# Patient Record
Sex: FEMALE | Race: BLACK OR AFRICAN AMERICAN | NOT HISPANIC OR LATINO | Employment: FULL TIME | URBAN - METROPOLITAN AREA
[De-identification: names, ages, dates, MRNs, and addresses within clinical notes are randomized per-mention and may not be internally consistent; named-entity substitution may affect disease eponyms.]

---

## 2020-04-10 ENCOUNTER — HOSPITAL ENCOUNTER (EMERGENCY)
Facility: HOSPITAL | Age: 55
Discharge: HOME | End: 2020-04-10
Attending: EMERGENCY MEDICINE
Payer: COMMERCIAL

## 2020-04-10 VITALS
HEART RATE: 61 BPM | OXYGEN SATURATION: 100 % | RESPIRATION RATE: 16 BRPM | TEMPERATURE: 98 F | WEIGHT: 175 LBS | DIASTOLIC BLOOD PRESSURE: 83 MMHG | SYSTOLIC BLOOD PRESSURE: 133 MMHG | HEIGHT: 67 IN | BODY MASS INDEX: 27.47 KG/M2

## 2020-04-10 DIAGNOSIS — Z20.822 CLOSE EXPOSURE TO COVID-19 VIRUS: Primary | ICD-10-CM

## 2020-04-10 LAB — SARS-COV-2 RDRP RESP QL NAA+PROBE: NEGATIVE

## 2020-04-10 PROCEDURE — 99284 EMERGENCY DEPT VISIT MOD MDM: CPT | Mod: 25

## 2020-04-10 PROCEDURE — 25000003 PHARM REV CODE 250: Performed by: NURSE PRACTITIONER

## 2020-04-10 PROCEDURE — U0002 COVID-19 LAB TEST NON-CDC: HCPCS

## 2020-04-10 RX ORDER — BUTALBITAL, ACETAMINOPHEN AND CAFFEINE 50; 325; 40 MG/1; MG/1; MG/1
1 TABLET ORAL EVERY 4 HOURS PRN
Qty: 20 TABLET | Refills: 0 | Status: SHIPPED | OUTPATIENT
Start: 2020-04-10 | End: 2020-05-10

## 2020-04-10 RX ORDER — BUTALBITAL, ACETAMINOPHEN AND CAFFEINE 50; 325; 40 MG/1; MG/1; MG/1
1 TABLET ORAL EVERY 4 HOURS PRN
Status: DISCONTINUED | OUTPATIENT
Start: 2020-04-10 | End: 2020-04-10 | Stop reason: HOSPADM

## 2020-04-10 RX ADMIN — BUTALBITAL, ACETAMINOPHEN AND CAFFEINE 1 TABLET: 50; 325; 40 TABLET ORAL at 03:04

## 2020-04-10 NOTE — DISCHARGE INSTRUCTIONS
At this time you must quarantine at home until you have   3 days without fever  2.   Must have not taken any fever reducing medications   3.  Other symptoms such as cough should be improving.  4.  Must have been at least 7 days since first symptom.    At this time it is important to self quarantine at home and to call the [facility] at [phone] or text PREETD to [phone] for further advice on when quarantine may be lifted.  You may talk to a doctor by virtual visit by going to [email or web address] or [email or web address] or call your primary care doctor for further advice.  You should return to the ER immediately if you have difficulty breathing, have any worsening symptoms or any concerns. You may call [phone] for 24/7 Covid-19 information.    Places for Testing         [place] (for [facility] and [facility] patients only)                   [facility] [address] [place] [address]                             [facility] [place] [address], [address]  [place] [address]    [place] [facility]  [address] [place] [address]          [facility] [address]        [address]         [place], [address]          [facility] [address]        [address] [place] [address]          [facility] [address] [employer] [address]        [address] [place] [facility] [place]  [address]  [place] [address]

## 2020-04-10 NOTE — ED NOTES
Discharge instructions, diagnosis, medications, and follow up discussed with patient. Patient verbalized understanding. All questions and concerns answered. No needs expressed at the time. Pt is awake, alert and oriented with no acute distress noted. Respirations even and unlabored.

## 2020-04-10 NOTE — ED NOTES
Patient reports headache and general malaise for a few days.     LOC: The patient is awake, alert and aware of environment with an appropriate affect, the patient is oriented x 3 and speaking appropriately.  APPEARANCE: Patient resting comfortably and in no acute distress, patient is clean and well groomed, patient's clothing properly fastened.  SKIN: The skin is warm and dry, color consistent with ethnicity, patient has normal skin turgor and moist mucus membranes, skin intact, no breakdown or brusing noted.  MUSKULOSKELETAL: Patient moving all extremities well, no obvious swelling or deformities noted.  RESPIRATORY: Airway is open and patent, respirations are spontaneous, patient has a normal effort and rate, no accessory muscle use noted.  CARDIAC: Patient has a normal rate and rhythm, no peripheral edema noted, capillary refill < 3 seconds.  ABDOMEN: Soft and non tender to palpation, no distention noted.  NEUROLOGIC: PERRL, 3mm bilaterally, eyes open spontaneously, behavior appropriate to situation, follows commands, facial expression symmetrical, bilateral hand grasp equal and even, purposeful motor response noted, normal sensation in all extremities when touched with a finger.

## 2020-04-10 NOTE — ED PROVIDER NOTES
Encounter Date: 4/10/2020       History     Chief Complaint   Patient presents with    Headache     Pt reports headache and general malaise. Pt reports taking care of  who has tested postive for COVID.      Presents with complaint of cough and HA  Denies fever or SOB  Pt is currently living with her  who was tested COVID 19 +         Review of patient's allergies indicates:  No Known Allergies  No past medical history on file.  No past surgical history on file.  No family history on file.  Social History     Tobacco Use    Smoking status: Not on file   Substance Use Topics    Alcohol use: Not on file    Drug use: Not on file     Review of Systems   Constitutional: Negative for fever.   Respiratory: Positive for cough. Negative for shortness of breath and wheezing.    Cardiovascular: Negative for chest pain, palpitations and leg swelling.   Gastrointestinal: Negative for abdominal pain, diarrhea, nausea and vomiting.   Genitourinary: Negative for dysuria.   Musculoskeletal: Negative for back pain.   Skin: Negative for rash.   Neurological: Positive for headaches. Negative for dizziness, weakness and light-headedness.       Physical Exam     Initial Vitals [04/10/20 1455]   BP Pulse Resp Temp SpO2   133/83 61 16 98.4 °F (36.9 °C) 100 %      MAP       --         Physical Exam    Constitutional: She appears well-developed and well-nourished.   HENT:   Head: Normocephalic and atraumatic.   Mouth/Throat: Oropharynx is clear and moist.   Eyes: Conjunctivae are normal.   Neck: Normal range of motion. Neck supple.   Cardiovascular: Normal rate and regular rhythm.   Pulmonary/Chest: Breath sounds normal. No respiratory distress.   Musculoskeletal: Normal range of motion.   Neurological: She is alert and oriented to person, place, and time. No sensory deficit. GCS score is 15. GCS eye subscore is 4. GCS verbal subscore is 5. GCS motor subscore is 6.   Skin: Skin is warm and dry. Capillary refill takes less  than 2 seconds.   Psychiatric: She has a normal mood and affect. Thought content normal.         ED Course   Procedures  Labs Reviewed   SARS-COV-2 RNA AMPLIFICATION, QUAL    Narrative:     What symptom criteria does the patient meet?->Cough          Imaging Results          X-Ray Chest AP Portable (Final result)  Result time 04/10/20 15:13:02    Final result by Erik Orourke MD (04/10/20 15:13:02)                 Impression:      1. Mild focal atelectasis or infiltrate at the right lung base.  2. Minimal linear scarring or atelectasis at the left lung base.  3. No other significant findings.      Electronically signed by: Erik Orourke MD  Date:    04/10/2020  Time:    15:13             Narrative:    EXAMINATION:  XR CHEST AP PORTABLE    CLINICAL HISTORY:  Headache, cough    COMPARISON:  None.    FINDINGS:  Heart size is normal.  The mediastinum is unremarkable.  There is mild linear atelectasis at the left lung base.  There is minimal focal atelectasis or infiltrate is demonstrated at the right lung base.  There are no pleural effusions.  Osseous structures are unremarkable.                                 Medical Decision Making:   Initial Assessment:   Cough and BLANCO   tested + for COVID    ED Management:  This pt has been given oral and written COVID 19 precautions  She verbalized understanding of instructions  She appears well and is in NAD  She reports HA improved with medication   Have discussed this pt with Dr. Bellamy              Attending Attestation:     Physician Attestation Statement for NP/PA:   I discussed this assessment and plan of this patient with the NP/PA, but I did not personally examine the patient. The face to face encounter was performed by the NP/PA.                                Clinical Impression:       ICD-10-CM ICD-9-CM   1. Close Exposure to Covid-19 Virus Z20.828                                 Aishwarya Manjarrez NP  04/10/20 1534       Walter Bellamy,  MD  04/10/20 1539

## 2021-03-01 ENCOUNTER — OFFICE VISIT (OUTPATIENT)
Dept: OBSTETRICS AND GYNECOLOGY | Facility: CLINIC | Age: 56
End: 2021-03-01
Payer: MEDICAID

## 2021-03-01 VITALS
BODY MASS INDEX: 28.25 KG/M2 | HEIGHT: 67 IN | DIASTOLIC BLOOD PRESSURE: 99 MMHG | SYSTOLIC BLOOD PRESSURE: 144 MMHG | WEIGHT: 180 LBS

## 2021-03-01 DIAGNOSIS — N90.89 VULVAR LESION: ICD-10-CM

## 2021-03-01 DIAGNOSIS — Z01.419 ENCOUNTER FOR ANNUAL ROUTINE GYNECOLOGICAL EXAMINATION: Primary | ICD-10-CM

## 2021-03-01 PROCEDURE — 99999 PR PBB SHADOW E&M-NEW PATIENT-LVL III: CPT | Mod: PBBFAC,,, | Performed by: ADVANCED PRACTICE MIDWIFE

## 2021-03-01 PROCEDURE — 99386 PREV VISIT NEW AGE 40-64: CPT | Mod: S$PBB,,, | Performed by: ADVANCED PRACTICE MIDWIFE

## 2021-03-01 PROCEDURE — 99999 PR PBB SHADOW E&M-NEW PATIENT-LVL III: ICD-10-PCS | Mod: PBBFAC,,, | Performed by: ADVANCED PRACTICE MIDWIFE

## 2021-03-01 PROCEDURE — 99386 PR PREVENTIVE VISIT,NEW,40-64: ICD-10-PCS | Mod: S$PBB,,, | Performed by: ADVANCED PRACTICE MIDWIFE

## 2021-03-01 PROCEDURE — 99203 OFFICE O/P NEW LOW 30 MIN: CPT | Mod: PBBFAC | Performed by: ADVANCED PRACTICE MIDWIFE

## 2021-03-01 PROCEDURE — 87529 HSV DNA AMP PROBE: CPT

## 2021-03-01 PROCEDURE — 88175 CYTOPATH C/V AUTO FLUID REDO: CPT | Performed by: ADVANCED PRACTICE MIDWIFE

## 2021-03-01 RX ORDER — METFORMIN HYDROCHLORIDE 500 MG/5ML
SOLUTION ORAL
COMMUNITY
End: 2021-05-12

## 2021-03-01 RX ORDER — LISINOPRIL 40 MG/1
TABLET ORAL
COMMUNITY
End: 2021-05-12

## 2021-03-02 RX ORDER — CLOTRIMAZOLE AND BETAMETHASONE DIPROPIONATE 10; .64 MG/G; MG/G
CREAM TOPICAL
Qty: 15 G | Refills: 1 | Status: SHIPPED | OUTPATIENT
Start: 2021-03-02 | End: 2021-05-12

## 2021-03-03 ENCOUNTER — HOSPITAL ENCOUNTER (OUTPATIENT)
Dept: RADIOLOGY | Facility: OTHER | Age: 56
Discharge: HOME | End: 2021-03-03
Attending: ADVANCED PRACTICE MIDWIFE
Payer: MEDICAID

## 2021-03-03 ENCOUNTER — PATIENT MESSAGE (OUTPATIENT)
Dept: OBSTETRICS AND GYNECOLOGY | Facility: CLINIC | Age: 56
End: 2021-03-03

## 2021-03-03 DIAGNOSIS — Z01.419 ENCOUNTER FOR ANNUAL ROUTINE GYNECOLOGICAL EXAMINATION: ICD-10-CM

## 2021-03-03 DIAGNOSIS — Z12.31 BREAST CANCER SCREENING BY MAMMOGRAM: ICD-10-CM

## 2021-03-03 LAB
HSV1 DNA SPEC QL NAA+PROBE: NEGATIVE
HSV2 DNA SPEC QL NAA+PROBE: NEGATIVE
SPECIMEN SOURCE: NORMAL

## 2021-03-03 PROCEDURE — 77063 BREAST TOMOSYNTHESIS BI: CPT | Mod: 26,,, | Performed by: RADIOLOGY

## 2021-03-03 PROCEDURE — 77063 MAMMO DIGITAL SCREENING BILAT WITH TOMO: ICD-10-PCS | Mod: 26,,, | Performed by: RADIOLOGY

## 2021-03-03 PROCEDURE — 77067 SCR MAMMO BI INCL CAD: CPT | Mod: TC

## 2021-03-03 PROCEDURE — 77067 MAMMO DIGITAL SCREENING BILAT WITH TOMO: ICD-10-PCS | Mod: 26,,, | Performed by: RADIOLOGY

## 2021-03-03 PROCEDURE — 77067 SCR MAMMO BI INCL CAD: CPT | Mod: 26,,, | Performed by: RADIOLOGY

## 2021-03-05 ENCOUNTER — PATIENT MESSAGE (OUTPATIENT)
Dept: OBSTETRICS AND GYNECOLOGY | Facility: CLINIC | Age: 56
End: 2021-03-05

## 2021-03-08 ENCOUNTER — PATIENT MESSAGE (OUTPATIENT)
Dept: OBSTETRICS AND GYNECOLOGY | Facility: CLINIC | Age: 56
End: 2021-03-08

## 2021-03-08 LAB
CLINICAL INFO: NORMAL
CYTO CVX: NORMAL
CYTOLOGIST CVX/VAG CYTO: NORMAL
CYTOLOGY CMNT CVX/VAG CYTO-IMP: NORMAL
CYTOLOGY PAP THIN PREP EXPLANATION: NORMAL
DATE OF PREVIOUS PAP: NORMAL
DATE PREVIOUS BX: NO
LMP START DATE: NORMAL
MICROORGANISM CVX/VAG CYTO: NORMAL
SPECIMEN SOURCE CVX/VAG CYTO: NORMAL
STAT OF ADQ CVX/VAG CYTO-IMP: NORMAL

## 2021-03-09 ENCOUNTER — PATIENT MESSAGE (OUTPATIENT)
Dept: OBSTETRICS AND GYNECOLOGY | Facility: CLINIC | Age: 56
End: 2021-03-09

## 2021-04-29 ENCOUNTER — PATIENT MESSAGE (OUTPATIENT)
Dept: RESEARCH | Facility: HOSPITAL | Age: 56
End: 2021-04-29

## 2021-05-12 ENCOUNTER — PATIENT MESSAGE (OUTPATIENT)
Dept: OBSTETRICS AND GYNECOLOGY | Facility: CLINIC | Age: 56
End: 2021-05-12

## 2021-05-12 ENCOUNTER — OFFICE VISIT (OUTPATIENT)
Dept: OBSTETRICS AND GYNECOLOGY | Facility: CLINIC | Age: 56
End: 2021-05-12
Payer: MEDICAID

## 2021-05-12 ENCOUNTER — HOSPITAL ENCOUNTER (OUTPATIENT)
Dept: RADIOLOGY | Facility: OTHER | Age: 56
Discharge: HOME | End: 2021-05-12
Attending: ADVANCED PRACTICE MIDWIFE
Payer: MEDICAID

## 2021-05-12 VITALS
DIASTOLIC BLOOD PRESSURE: 80 MMHG | BODY MASS INDEX: 28.68 KG/M2 | WEIGHT: 182.75 LBS | SYSTOLIC BLOOD PRESSURE: 134 MMHG | HEIGHT: 67 IN

## 2021-05-12 DIAGNOSIS — R07.81 RIB PAIN ON RIGHT SIDE: Primary | ICD-10-CM

## 2021-05-12 DIAGNOSIS — R07.81 RIB PAIN ON RIGHT SIDE: ICD-10-CM

## 2021-05-12 PROCEDURE — 71046 X-RAY EXAM CHEST 2 VIEWS: CPT | Mod: TC,FY

## 2021-05-12 PROCEDURE — 99999 PR PBB SHADOW E&M-EST. PATIENT-LVL III: CPT | Mod: PBBFAC,,, | Performed by: ADVANCED PRACTICE MIDWIFE

## 2021-05-12 PROCEDURE — 71046 X-RAY EXAM CHEST 2 VIEWS: CPT | Mod: 26,,, | Performed by: RADIOLOGY

## 2021-05-12 PROCEDURE — 99213 OFFICE O/P EST LOW 20 MIN: CPT | Mod: S$PBB,,, | Performed by: ADVANCED PRACTICE MIDWIFE

## 2021-05-12 PROCEDURE — 71046 XR CHEST PA AND LATERAL: ICD-10-PCS | Mod: 26,,, | Performed by: RADIOLOGY

## 2021-05-12 PROCEDURE — 99213 PR OFFICE/OUTPT VISIT, EST, LEVL III, 20-29 MIN: ICD-10-PCS | Mod: S$PBB,,, | Performed by: ADVANCED PRACTICE MIDWIFE

## 2021-05-12 PROCEDURE — 99999 PR PBB SHADOW E&M-EST. PATIENT-LVL III: ICD-10-PCS | Mod: PBBFAC,,, | Performed by: ADVANCED PRACTICE MIDWIFE

## 2021-05-12 PROCEDURE — 99213 OFFICE O/P EST LOW 20 MIN: CPT | Mod: PBBFAC,25 | Performed by: ADVANCED PRACTICE MIDWIFE

## 2021-05-12 RX ORDER — METFORMIN HYDROCHLORIDE 750 MG/1
750 TABLET, EXTENDED RELEASE ORAL DAILY
COMMUNITY
Start: 2021-05-06

## 2021-05-12 RX ORDER — LISINOPRIL AND HYDROCHLOROTHIAZIDE 20; 25 MG/1; MG/1
1 TABLET ORAL
COMMUNITY
Start: 2021-01-12

## 2021-08-18 RX ORDER — CLOTRIMAZOLE AND BETAMETHASONE DIPROPIONATE 10; .64 MG/G; MG/G
CREAM TOPICAL 2 TIMES DAILY
COMMUNITY
End: 2021-08-18 | Stop reason: SDUPTHER

## 2021-08-18 RX ORDER — CLOTRIMAZOLE AND BETAMETHASONE DIPROPIONATE 10; .64 MG/G; MG/G
CREAM TOPICAL 2 TIMES DAILY
Qty: 1 TUBE | Refills: 0 | Status: SHIPPED | OUTPATIENT
Start: 2021-08-18

## 2021-11-15 ENCOUNTER — IMMUNIZATION (OUTPATIENT)
Dept: PRIMARY CARE CLINIC | Facility: CLINIC | Age: 56
End: 2021-11-15
Payer: MEDICAID

## 2021-11-15 DIAGNOSIS — Z23 NEED FOR VACCINATION: Primary | ICD-10-CM

## 2021-11-15 PROCEDURE — 0004A COVID-19, MRNA, LNP-S, PF, 30 MCG/0.3 ML DOSE VACCINE: CPT | Mod: CV19,PBBFAC | Performed by: INTERNAL MEDICINE

## 2022-07-22 ENCOUNTER — OFFICE VISIT (OUTPATIENT)
Dept: OBSTETRICS AND GYNECOLOGY | Facility: CLINIC | Age: 57
End: 2022-07-22
Payer: COMMERCIAL

## 2022-07-22 VITALS
DIASTOLIC BLOOD PRESSURE: 80 MMHG | HEIGHT: 67 IN | SYSTOLIC BLOOD PRESSURE: 130 MMHG | BODY MASS INDEX: 26.96 KG/M2 | WEIGHT: 171.75 LBS

## 2022-07-22 DIAGNOSIS — Z01.419 WELL WOMAN EXAM WITH ROUTINE GYNECOLOGICAL EXAM: ICD-10-CM

## 2022-07-22 DIAGNOSIS — Z12.31 ENCOUNTER FOR SCREENING MAMMOGRAM FOR BREAST CANCER: Primary | ICD-10-CM

## 2022-07-22 PROCEDURE — 1159F MED LIST DOCD IN RCRD: CPT | Mod: CPTII,S$GLB,, | Performed by: ADVANCED PRACTICE MIDWIFE

## 2022-07-22 PROCEDURE — 99396 PREV VISIT EST AGE 40-64: CPT | Mod: S$GLB,,, | Performed by: ADVANCED PRACTICE MIDWIFE

## 2022-07-22 PROCEDURE — 99396 PR PREVENTIVE VISIT,EST,40-64: ICD-10-PCS | Mod: S$GLB,,, | Performed by: ADVANCED PRACTICE MIDWIFE

## 2022-07-22 PROCEDURE — 3008F BODY MASS INDEX DOCD: CPT | Mod: CPTII,S$GLB,, | Performed by: ADVANCED PRACTICE MIDWIFE

## 2022-07-22 PROCEDURE — 99999 PR PBB SHADOW E&M-EST. PATIENT-LVL III: CPT | Mod: PBBFAC,,, | Performed by: ADVANCED PRACTICE MIDWIFE

## 2022-07-22 PROCEDURE — 3079F DIAST BP 80-89 MM HG: CPT | Mod: CPTII,S$GLB,, | Performed by: ADVANCED PRACTICE MIDWIFE

## 2022-07-22 PROCEDURE — 1159F PR MEDICATION LIST DOCUMENTED IN MEDICAL RECORD: ICD-10-PCS | Mod: CPTII,S$GLB,, | Performed by: ADVANCED PRACTICE MIDWIFE

## 2022-07-22 PROCEDURE — 99999 PR PBB SHADOW E&M-EST. PATIENT-LVL III: ICD-10-PCS | Mod: PBBFAC,,, | Performed by: ADVANCED PRACTICE MIDWIFE

## 2022-07-22 PROCEDURE — 4010F PR ACE/ARB THEARPY RXD/TAKEN: ICD-10-PCS | Mod: CPTII,S$GLB,, | Performed by: ADVANCED PRACTICE MIDWIFE

## 2022-07-22 PROCEDURE — 3075F SYST BP GE 130 - 139MM HG: CPT | Mod: CPTII,S$GLB,, | Performed by: ADVANCED PRACTICE MIDWIFE

## 2022-07-22 PROCEDURE — 3008F PR BODY MASS INDEX (BMI) DOCUMENTED: ICD-10-PCS | Mod: CPTII,S$GLB,, | Performed by: ADVANCED PRACTICE MIDWIFE

## 2022-07-22 PROCEDURE — 4010F ACE/ARB THERAPY RXD/TAKEN: CPT | Mod: CPTII,S$GLB,, | Performed by: ADVANCED PRACTICE MIDWIFE

## 2022-07-22 PROCEDURE — 3079F PR MOST RECENT DIASTOLIC BLOOD PRESSURE 80-89 MM HG: ICD-10-PCS | Mod: CPTII,S$GLB,, | Performed by: ADVANCED PRACTICE MIDWIFE

## 2022-07-22 PROCEDURE — 3075F PR MOST RECENT SYSTOLIC BLOOD PRESS GE 130-139MM HG: ICD-10-PCS | Mod: CPTII,S$GLB,, | Performed by: ADVANCED PRACTICE MIDWIFE

## 2022-07-22 RX ORDER — EXENATIDE 2 MG/.85ML
INJECTION, SUSPENSION, EXTENDED RELEASE SUBCUTANEOUS
COMMUNITY
Start: 2022-02-14

## 2022-07-22 RX ORDER — SEMAGLUTIDE 1.34 MG/ML
0.25 INJECTION, SOLUTION SUBCUTANEOUS
COMMUNITY
Start: 2022-06-30

## 2022-07-22 RX ORDER — HYDROCHLOROTHIAZIDE 12.5 MG/1
CAPSULE ORAL
COMMUNITY

## 2022-07-22 RX ORDER — ATORVASTATIN CALCIUM 10 MG/1
10 TABLET, FILM COATED ORAL
COMMUNITY
Start: 2022-06-30 | End: 2023-06-30

## 2022-07-22 RX ORDER — LISINOPRIL 40 MG/1
TABLET ORAL
COMMUNITY

## 2022-07-22 NOTE — PROGRESS NOTES
HISTORY OF PRESENT ILLNESS:    Shawn Wagner is a 57 y.o. female, , No LMP recorded (exact date). Patient has had an implant.,  presents for a routine annual exam . Pt has no complaints or concerns. mirena -she thinks about 5 years. Pap current. Mammogram ordered, overdue. Thinks she did cologuard about 5 years ago, her pcp handles this. Feels safe at home. Declined STD testing    Past Medical History:   Diagnosis Date    Diabetes mellitus     Hypertension        Past Surgical History:   Procedure Laterality Date     SECTION         MEDICATIONS AND ALLERGIES:      Current Outpatient Medications:     atorvastatin (LIPITOR) 10 MG tablet, Take 10 mg by mouth., Disp: , Rfl:     BYDUREON BCISE 2 mg/0.85 mL AtIn, SMARTSI Milligram(s) Topical Once a Week, Disp: , Rfl:     clotrimazole-betamethasone 1-0.05% (LOTRISONE) cream, Apply topically 2 (two) times daily., Disp: 1 Tube, Rfl: 0    hydroCHLOROthiazide (MICROZIDE) 12.5 mg capsule, 1 cap(s), Disp: , Rfl:     lisinopriL (PRINIVIL,ZESTRIL) 40 MG tablet, 1 tab(s), Disp: , Rfl:     lisinopriL-hydrochlorothiazide (PRINZIDE,ZESTORETIC) 20-25 mg Tab, Take 1 tablet by mouth., Disp: , Rfl:     metFORMIN (GLUCOPHAGE-XR) 750 MG ER 24hr tablet, Take 750 mg by mouth once daily., Disp: , Rfl:     semaglutide (OZEMPIC) 0.25 mg or 0.5 mg(2 mg/1.5 mL) pen injector, Inject 0.25 mg into the skin., Disp: , Rfl:     Review of patient's allergies indicates:  No Known Allergies    Family History   Problem Relation Age of Onset    Diabetes Mother     Breast cancer Neg Hx     Cancer Neg Hx     Colon cancer Neg Hx     Ovarian cancer Neg Hx        Social History     Socioeconomic History    Marital status: Single   Tobacco Use    Smoking status: Never Smoker    Smokeless tobacco: Never Used   Substance and Sexual Activity    Alcohol use: Yes    Drug use: Never       COMPREHENSIVE GYN HISTORY:  PAP History: Denies abnormal Paps.  Infection History:  "Denies STDs. Denies PID.  Benign History: Denies uterine fibroids. Denies ovarian cysts. Denies endometriosis. Denies other conditions.  Cancer History: Denies cervical cancer. Denies uterine cancer or hyperplasia. Denies ovarian cancer. Denies vulvar cancer or pre-cancer. Denies vaginal cancer or pre-cancer. Denies breast cancer. Denies colon cancer.  Sexual Activity History:  currently  sexually active  Menstrual History: no menstrual cycle currently   Contraception: IUD    ROS:  GENERAL: No weight changes. No swelling. No fatigue. No fever.  BREASTS: No pain. No lumps. No discharge.  ABDOMEN: No pain. No nausea. No vomiting. No diarrhea. No constipation.  REPRODUCTIVE: No abnormal bleeding.   VULVA: No pain. No lesions. No itching.  VAGINA: No relaxation. No itching. No odor. No discharge. No lesions.  URINARY: No incontinence. No nocturia. No frequency. No dysuria.    /80   Ht 5' 7" (1.702 m)   Wt 77.9 kg (171 lb 11.8 oz)   LMP  (Exact Date)   BMI 26.90 kg/m²     PE:  APPEARANCE: Well nourished, well developed, in no acute distress.  AFFECT: WNL, alert and oriented x 3. Interactive during exam  SKIN: No acne or hirsutism.  NECK: Neck symmetric, without masses or thyromegaly.  NODES: No inguinal, axillary or femoral lymph node enlargement.  CHEST: Good respiratory effort.   ABDOMEN: Soft. No tenderness or masses. No hepatosplenomegaly. No hernias.  BREASTS: Symmetrical, no skin changes, visible lesions, palpable masses or nipple discharge bilaterally.  PELVIC: External female genitalia without lesions.  Female hair distribution. Adequate perineal body, Normal urethral meatus. Vagina moist and well rugated without lesions or discharge.  No significant cystocele or rectocele present. Cervix pink without lesions, discharge or tenderness. +IUD strings Uterus is 4-6 week size, regular, mobile and nontender. Adnexa without masses or tenderness.  EXTREMITIES: No edema    PROCEDURES:  String check  Breast " exam    DIAGNOSIS:  1. WWE    LABS AND TESTS ORDERED: mammo    MEDICATIONS PRESCRIBED:    COUNSELING:  The patient was counseled today on:  -A.C.S. Pap and pelvic exam guidelines, recomendations for yearly mammogram, monthly self breast exams and to follow up with her PCP for other health maintenance.    FOLLOW-UP annually for WWE - may discuss removal of IUD at next annual.

## 2022-08-05 ENCOUNTER — HOSPITAL ENCOUNTER (OUTPATIENT)
Dept: RADIOLOGY | Facility: HOSPITAL | Age: 57
Discharge: HOME | End: 2022-08-05
Attending: ADVANCED PRACTICE MIDWIFE
Payer: COMMERCIAL

## 2022-08-05 VITALS — HEIGHT: 67 IN | BODY MASS INDEX: 26.96 KG/M2 | WEIGHT: 171.75 LBS

## 2022-08-05 DIAGNOSIS — Z12.31 ENCOUNTER FOR SCREENING MAMMOGRAM FOR BREAST CANCER: ICD-10-CM

## 2022-08-05 PROCEDURE — 77067 SCR MAMMO BI INCL CAD: CPT | Mod: TC,PO

## 2022-08-05 PROCEDURE — 77063 BREAST TOMOSYNTHESIS BI: CPT | Mod: TC,PO

## 2022-08-08 ENCOUNTER — PATIENT MESSAGE (OUTPATIENT)
Dept: OBSTETRICS AND GYNECOLOGY | Facility: CLINIC | Age: 57
End: 2022-08-08
Payer: COMMERCIAL

## 2023-09-05 DIAGNOSIS — Z12.31 ENCOUNTER FOR SCREENING MAMMOGRAM FOR MALIGNANT NEOPLASM OF BREAST: Primary | ICD-10-CM

## 2023-09-14 ENCOUNTER — HOSPITAL ENCOUNTER (OUTPATIENT)
Dept: RADIOLOGY | Facility: HOSPITAL | Age: 58
Discharge: HOME | End: 2023-09-14
Attending: OBSTETRICS & GYNECOLOGY
Payer: COMMERCIAL

## 2023-09-14 DIAGNOSIS — Z12.31 ENCOUNTER FOR SCREENING MAMMOGRAM FOR MALIGNANT NEOPLASM OF BREAST: ICD-10-CM

## 2023-09-14 PROCEDURE — 77067 SCR MAMMO BI INCL CAD: CPT | Mod: TC,PO

## 2023-12-04 ENCOUNTER — OFFICE VISIT (OUTPATIENT)
Dept: URGENT CARE | Facility: CLINIC | Age: 58
End: 2023-12-04
Payer: COMMERCIAL

## 2023-12-04 VITALS
TEMPERATURE: 100 F | RESPIRATION RATE: 20 BRPM | OXYGEN SATURATION: 97 % | WEIGHT: 171 LBS | SYSTOLIC BLOOD PRESSURE: 131 MMHG | HEART RATE: 97 BPM | HEIGHT: 67 IN | DIASTOLIC BLOOD PRESSURE: 83 MMHG | BODY MASS INDEX: 26.84 KG/M2

## 2023-12-04 DIAGNOSIS — J01.10 ACUTE NON-RECURRENT FRONTAL SINUSITIS: Primary | ICD-10-CM

## 2023-12-04 PROCEDURE — 99214 PR OFFICE/OUTPT VISIT, EST, LEVL IV, 30-39 MIN: ICD-10-PCS | Mod: 25,S$GLB,, | Performed by: NURSE PRACTITIONER

## 2023-12-04 PROCEDURE — 99214 OFFICE O/P EST MOD 30 MIN: CPT | Mod: 25,S$GLB,, | Performed by: NURSE PRACTITIONER

## 2023-12-04 PROCEDURE — 96372 PR INJECTION,THERAP/PROPH/DIAG2ST, IM OR SUBCUT: ICD-10-PCS | Mod: S$GLB,,, | Performed by: NURSE PRACTITIONER

## 2023-12-04 PROCEDURE — 96372 THER/PROPH/DIAG INJ SC/IM: CPT | Mod: S$GLB,,, | Performed by: NURSE PRACTITIONER

## 2023-12-04 RX ORDER — DEXAMETHASONE SODIUM PHOSPHATE 4 MG/ML
4 INJECTION, SOLUTION INTRA-ARTICULAR; INTRALESIONAL; INTRAMUSCULAR; INTRAVENOUS; SOFT TISSUE
Status: COMPLETED | OUTPATIENT
Start: 2023-12-04 | End: 2023-12-04

## 2023-12-04 RX ORDER — FLUTICASONE PROPIONATE 50 MCG
2 SPRAY, SUSPENSION (ML) NASAL DAILY
Qty: 16 G | Refills: 1 | Status: SHIPPED | OUTPATIENT
Start: 2023-12-04 | End: 2024-01-03

## 2023-12-04 RX ORDER — AMOXICILLIN 500 MG/1
500 CAPSULE ORAL 3 TIMES DAILY
Qty: 30 CAPSULE | Refills: 0 | Status: SHIPPED | OUTPATIENT
Start: 2023-12-04 | End: 2023-12-14

## 2023-12-04 RX ORDER — PROMETHAZINE HYDROCHLORIDE AND DEXTROMETHORPHAN HYDROBROMIDE 6.25; 15 MG/5ML; MG/5ML
5 SYRUP ORAL EVERY 6 HOURS PRN
Qty: 120 ML | Refills: 0 | Status: SHIPPED | OUTPATIENT
Start: 2023-12-04 | End: 2023-12-11

## 2023-12-04 RX ADMIN — DEXAMETHASONE SODIUM PHOSPHATE 4 MG: 4 INJECTION, SOLUTION INTRA-ARTICULAR; INTRALESIONAL; INTRAMUSCULAR; INTRAVENOUS; SOFT TISSUE at 04:12

## 2023-12-04 NOTE — PROGRESS NOTES
"Subjective:      Patient ID: Shawn Wagner is a 58 y.o. female.    Vitals:  height is 5' 7" (1.702 m) and weight is 77.6 kg (171 lb). Her temperature is 99.9 °F (37.7 °C). Her blood pressure is 131/83 and her pulse is 97. Her respiration is 20 and oxygen saturation is 97%.     Chief Complaint: Fatigue    Sinus. Pt request shot.     Fatigue  The current episode started in the past 7 days. Associated symptoms include chills, coughing, fatigue and headaches. She has tried NSAIDs for the symptoms. The treatment provided no relief.       Constitution: Positive for chills and fatigue.   Respiratory:  Positive for cough.    Neurological:  Positive for headaches.      Objective:     Physical Exam   Constitutional: She is oriented to person, place, and time. She appears ill.   HENT:   Head: Normocephalic and atraumatic.   Nose: Nose normal.   Mouth/Throat: Mucous membranes are moist. Oropharynx is clear.   Eyes: Pupils are equal, round, and reactive to light.   Neck: Neck supple.   Cardiovascular: Normal rate, regular rhythm, normal heart sounds and normal pulses.   Pulmonary/Chest: Effort normal and breath sounds normal.   Abdominal: Normal appearance.   Neurological: no focal deficit. She is alert and oriented to person, place, and time. She displays no weakness. Coordination and gait normal.       Assessment:     1. Acute non-recurrent frontal sinusitis        Plan:       Acute non-recurrent frontal sinusitis    Other orders  -     dexAMETHasone injection 4 mg  -     amoxicillin (AMOXIL) 500 MG capsule; Take 1 capsule (500 mg total) by mouth 3 (three) times daily. for 10 days  Dispense: 30 capsule; Refill: 0  -     fluticasone propionate (FLONASE) 50 mcg/actuation nasal spray; 2 sprays (100 mcg total) by Each Nostril route once daily.  Dispense: 16 g; Refill: 1  -     promethazine-dextromethorphan (PROMETHAZINE-DM) 6.25-15 mg/5 mL Syrp; Take 5 mLs by mouth every 6 (six) hours as needed (night time cough).  Dispense: " 120 mL; Refill: 0                  Sinusitis s/s 7 days worsened. Requested shot.

## 2023-12-04 NOTE — PATIENT INSTRUCTIONS
Claritin(loratadine), Allegra(fexofenadine), or zyrtec(cetirizine) for runny nose and congestion. Take in the PM.   Mucinex DM daily in the morning.

## 2023-12-08 ENCOUNTER — HOSPITAL ENCOUNTER (EMERGENCY)
Facility: HOSPITAL | Age: 58
Discharge: HOME | End: 2023-12-09
Attending: EMERGENCY MEDICINE
Payer: COMMERCIAL

## 2023-12-08 VITALS
BODY MASS INDEX: 27.62 KG/M2 | HEIGHT: 67 IN | DIASTOLIC BLOOD PRESSURE: 80 MMHG | WEIGHT: 176 LBS | HEART RATE: 96 BPM | OXYGEN SATURATION: 98 % | RESPIRATION RATE: 18 BRPM | SYSTOLIC BLOOD PRESSURE: 121 MMHG | TEMPERATURE: 100 F

## 2023-12-08 DIAGNOSIS — R53.83 FATIGUE, UNSPECIFIED TYPE: Primary | ICD-10-CM

## 2023-12-08 DIAGNOSIS — N30.00 ACUTE CYSTITIS WITHOUT HEMATURIA: ICD-10-CM

## 2023-12-08 LAB
ALBUMIN SERPL BCP-MCNC: 4.4 G/DL (ref 3.5–5.2)
ALP SERPL-CCNC: 121 U/L (ref 55–135)
ALT SERPL W/O P-5'-P-CCNC: 32 U/L (ref 10–44)
ANION GAP SERPL CALC-SCNC: 9 MMOL/L (ref 8–16)
AST SERPL-CCNC: 34 U/L (ref 10–40)
B-HCG UR QL: NEGATIVE
BACTERIA #/AREA URNS HPF: ABNORMAL /HPF
BASOPHILS # BLD AUTO: 0.05 K/UL (ref 0–0.2)
BASOPHILS NFR BLD: 0.8 % (ref 0–1.9)
BILIRUB SERPL-MCNC: 0.6 MG/DL (ref 0.1–1)
BILIRUB UR QL STRIP: NEGATIVE
BUN SERPL-MCNC: 13 MG/DL (ref 6–20)
CALCIUM SERPL-MCNC: 9 MG/DL (ref 8.7–10.5)
CHLORIDE SERPL-SCNC: 99 MMOL/L (ref 95–110)
CLARITY UR: ABNORMAL
CO2 SERPL-SCNC: 26 MMOL/L (ref 23–29)
COLOR UR: YELLOW
CREAT SERPL-MCNC: 0.8 MG/DL (ref 0.5–1.4)
DIFFERENTIAL METHOD: ABNORMAL
EOSINOPHIL # BLD AUTO: 0.1 K/UL (ref 0–0.5)
EOSINOPHIL NFR BLD: 1.6 % (ref 0–8)
ERYTHROCYTE [DISTWIDTH] IN BLOOD BY AUTOMATED COUNT: 13.1 % (ref 11.5–14.5)
EST. GFR  (NO RACE VARIABLE): >60 ML/MIN/1.73 M^2
GLUCOSE SERPL-MCNC: 149 MG/DL (ref 70–110)
GLUCOSE UR QL STRIP: NEGATIVE
HCT VFR BLD AUTO: 39.2 % (ref 37–48.5)
HGB BLD-MCNC: 13.3 G/DL (ref 12–16)
HGB UR QL STRIP: NEGATIVE
HYALINE CASTS #/AREA URNS LPF: 19 /LPF
IMM GRANULOCYTES # BLD AUTO: 0.1 K/UL (ref 0–0.04)
IMM GRANULOCYTES NFR BLD AUTO: 1.6 % (ref 0–0.5)
KETONES UR QL STRIP: NEGATIVE
LEUKOCYTE ESTERASE UR QL STRIP: ABNORMAL
LYMPHOCYTES # BLD AUTO: 2.4 K/UL (ref 1–4.8)
LYMPHOCYTES NFR BLD: 38.6 % (ref 18–48)
MCH RBC QN AUTO: 29.9 PG (ref 27–31)
MCHC RBC AUTO-ENTMCNC: 33.9 G/DL (ref 32–36)
MCV RBC AUTO: 88 FL (ref 82–98)
MICROSCOPIC COMMENT: ABNORMAL
MONOCYTES # BLD AUTO: 0.5 K/UL (ref 0.3–1)
MONOCYTES NFR BLD: 7.7 % (ref 4–15)
NEUTROPHILS # BLD AUTO: 3.1 K/UL (ref 1.8–7.7)
NEUTROPHILS NFR BLD: 49.7 % (ref 38–73)
NITRITE UR QL STRIP: POSITIVE
NRBC BLD-RTO: 0 /100 WBC
PH UR STRIP: 6 [PH] (ref 5–8)
PLATELET # BLD AUTO: 269 K/UL (ref 150–450)
PMV BLD AUTO: 9.5 FL (ref 9.2–12.9)
POTASSIUM SERPL-SCNC: 3.8 MMOL/L (ref 3.5–5.1)
PROT SERPL-MCNC: 7.5 G/DL (ref 6–8.4)
PROT UR QL STRIP: ABNORMAL
RBC # BLD AUTO: 4.45 M/UL (ref 4–5.4)
RBC #/AREA URNS HPF: 1 /HPF (ref 0–4)
SODIUM SERPL-SCNC: 134 MMOL/L (ref 136–145)
SP GR UR STRIP: 1.02 (ref 1–1.03)
SQUAMOUS #/AREA URNS HPF: 1 /HPF
URN SPEC COLLECT METH UR: ABNORMAL
UROBILINOGEN UR STRIP-ACNC: ABNORMAL EU/DL
WBC # BLD AUTO: 6.22 K/UL (ref 3.9–12.7)
WBC #/AREA URNS HPF: 30 /HPF (ref 0–5)

## 2023-12-08 PROCEDURE — 80053 COMPREHEN METABOLIC PANEL: CPT | Performed by: NURSE PRACTITIONER

## 2023-12-08 PROCEDURE — 87077 CULTURE AEROBIC IDENTIFY: CPT | Performed by: NURSE PRACTITIONER

## 2023-12-08 PROCEDURE — 87086 URINE CULTURE/COLONY COUNT: CPT | Performed by: NURSE PRACTITIONER

## 2023-12-08 PROCEDURE — 81001 URINALYSIS AUTO W/SCOPE: CPT | Performed by: NURSE PRACTITIONER

## 2023-12-08 PROCEDURE — 87186 SC STD MICRODIL/AGAR DIL: CPT | Performed by: NURSE PRACTITIONER

## 2023-12-08 PROCEDURE — 85025 COMPLETE CBC W/AUTO DIFF WBC: CPT | Performed by: NURSE PRACTITIONER

## 2023-12-08 PROCEDURE — 99283 EMERGENCY DEPT VISIT LOW MDM: CPT

## 2023-12-08 PROCEDURE — 81025 URINE PREGNANCY TEST: CPT | Performed by: EMERGENCY MEDICINE

## 2023-12-08 RX ORDER — NITROFURANTOIN 25; 75 MG/1; MG/1
100 CAPSULE ORAL 2 TIMES DAILY
Qty: 14 CAPSULE | Refills: 0 | Status: SHIPPED | OUTPATIENT
Start: 2023-12-08 | End: 2023-12-15

## 2023-12-09 NOTE — FIRST PROVIDER EVALUATION
Emergency Department TeleTriage Encounter Note      CHIEF COMPLAINT    Chief Complaint   Patient presents with    Abdominal Pain     TODAY, STARTED ANTIBX 4 DAYS AGO FOR SINUS INFECTION,        VITAL SIGNS   Initial Vitals [12/08/23 1810]   BP Pulse Resp Temp SpO2   121/80 96 18 99.7 °F (37.6 °C) 98 %      MAP       --            ALLERGIES    Review of patient's allergies indicates:  No Known Allergies    PROVIDER TRIAGE NOTE  Verbal consent for the teletriage evaluation was given by the patient at the start of the evaluation.  All efforts will be made to maintain patient's privacy during the evaluation.      This is a teletriage evaluation of a 58 y.o. female presenting to the ED with c/o fatigue and sharp abdominal pain that started today.  On ABX for a sinus infection. Denies N/V/D.  Limited physical exam via telehealth: The patient is awake, alert, answering questions appropriately and is not in respiratory distress.  As the Teletriage provider, I performed an initial assessment and ordered appropriate labs and imaging studies, if any, to facilitate the patient's care once placed in the ED. Once a room is available, care and a full evaluation will be completed by an alternate ED provider.  Any additional orders and the final disposition will be determined by that provider.  All imaging and labs will not be followed-up by the Teletriage Team, including myself.          ORDERS  Labs Reviewed   CBC W/ AUTO DIFFERENTIAL   COMPREHENSIVE METABOLIC PANEL   URINALYSIS, REFLEX TO URINE CULTURE   PREGNANCY TEST, URINE RAPID       ED Orders (720h ago, onward)      Start Ordered     Status Ordering Provider    12/08/23 1815 12/08/23 1814  Vital signs  Every 2 hours         Ordered HELADIO CHONG    12/08/23 1815 12/08/23 1814  Diet NPO  Diet effective now         Ordered HELADIO CHONG    12/08/23 1815 12/08/23 1814  Insert peripheral IV  Once         Ordered HELADIO CHONG    12/08/23 1815 12/08/23 1814  CBC W/ AUTO  DIFFERENTIAL  STAT         Ordered HELADIO CHONG    12/08/23 1815 12/08/23 1814  Comp. Metabolic Panel  STAT         Ordered HELADIO CHONG    12/08/23 1815 12/08/23 1814  Urinalysis, Reflex to Urine Culture Urine, Clean Catch  STAT         Ordered HELADIO CHONG    12/08/23 1815 12/08/23 1814  Pregnancy, urine rapid  Once         Ordered HELADIO CHONG A              Virtual Visit Note: The provider triage portion of this emergency department evaluation and documentation was performed via [employer], a HIPAA-compliant telemedicine application, in concert with a tele-presenter in the room. A face to face patient evaluation with one of my colleagues will occur once the patient is placed in an emergency department room.      DISCLAIMER: This note was prepared with [employer] voice recognition transcription software. Garbled syntax, mangled pronouns, and other bizarre constructions may be attributed to that software system.

## 2023-12-09 NOTE — ED PROVIDER NOTES
Encounter Date: 2023       History     Chief Complaint   Patient presents with    Abdominal Pain     TODAY, STARTED ANTIBX 4 DAYS AGO FOR SINUS INFECTION,      58-year-old female presenting with complaint of generalized weakness and fatigue.  She says that she had sinus congestion and was started on amoxicillin for a sinus infection 4 days ago.  She is worsening, severe fatigue.  She says she is no energy.  She thinks the antibiotics are making her worse.  She is had some occasional pain in her left lower abdomen but says this is very brief, denies any pain currently.    The history is provided by the patient.     Review of patient's allergies indicates:  No Known Allergies  Past Medical History:   Diagnosis Date    Diabetes mellitus     Hypertension      Past Surgical History:   Procedure Laterality Date     SECTION       Family History   Problem Relation Age of Onset    Diabetes Mother     Breast cancer Maternal Aunt     Cancer Neg Hx     Colon cancer Neg Hx     Ovarian cancer Neg Hx      Social History     Tobacco Use    Smoking status: Never    Smokeless tobacco: Never   Substance Use Topics    Alcohol use: Yes    Drug use: Never     Review of Systems   Constitutional:  Positive for fatigue.   Gastrointestinal:  Positive for abdominal pain.   Musculoskeletal:  Positive for myalgias.   All other systems reviewed and are negative.      Physical Exam     Initial Vitals [23 1810]   BP Pulse Resp Temp SpO2   121/80 96 18 99.7 °F (37.6 °C) 98 %      MAP       --         Physical Exam    Nursing note and vitals reviewed.  Constitutional: She appears well-developed and well-nourished. She is not diaphoretic. No distress.   HENT:   Head: Normocephalic.   Eyes: Conjunctivae are normal.   Neck: Neck supple.   Normal range of motion.  Cardiovascular:  Normal rate.           Pulmonary/Chest: No respiratory distress.   Abdominal: Abdomen is soft. She exhibits no distension. There is no abdominal tenderness.    Musculoskeletal:         General: No edema.      Cervical back: Normal range of motion and neck supple.     Neurological: She is alert. She has normal strength. GCS eye subscore is 4. GCS verbal subscore is 5. GCS motor subscore is 6.   Skin: Skin is warm and dry.   Psychiatric: She has a normal mood and affect.         ED Course   Procedures  Labs Reviewed   CBC W/ AUTO DIFFERENTIAL - Abnormal; Notable for the following components:       Result Value    Immature Granulocytes 1.6 (*)     Immature Grans (Abs) 0.10 (*)     All other components within normal limits   COMPREHENSIVE METABOLIC PANEL - Abnormal; Notable for the following components:    Sodium 134 (*)     Glucose 149 (*)     All other components within normal limits   URINALYSIS, REFLEX TO URINE CULTURE - Abnormal; Notable for the following components:    Appearance, UA Hazy (*)     Protein, UA Trace (*)     Nitrite, UA Positive (*)     Urobilinogen, UA 4.0-6.0 (*)     Leukocytes, UA 2+ (*)     All other components within normal limits    Narrative:     Specimen Source->Urine   URINALYSIS MICROSCOPIC - Abnormal; Notable for the following components:    WBC, UA 30 (*)     Bacteria Many (*)     Hyaline Casts, UA 19 (*)     All other components within normal limits    Narrative:     Specimen Source->Urine   CULTURE, URINE   PREGNANCY TEST, URINE RAPID    Narrative:     Specimen Source->Urine   POCT URINE PREGNANCY          Imaging Results    None          Medications - No data to display  Medical Decision Making  Patient's abdominal exam is very benign without reproducible tenderness.  She has no leukocytosis.  Normal creatinine, electrolytes, LFTs.  She does have a large nitrite positive UTI.  Patient was reassured.  I will place her on Macrobid.  Discharged home with return precautions.    Amount and/or Complexity of Data Reviewed  Labs: ordered.    Risk  Prescription drug management.                                      Clinical Impression:  Final  diagnoses:  [R53.83] Fatigue, unspecified type (Primary)  [N30.00] Acute cystitis without hematuria          ED Disposition Condition    Discharge Stable          ED Prescriptions       Medication Sig Dispense Start Date End Date Auth. Provider    nitrofurantoin, macrocrystal-monohydrate, (MACROBID) 100 MG capsule Take 1 capsule (100 mg total) by mouth 2 (two) times daily. for 7 days 14 capsule 12/8/2023 12/15/2023 Hernan Aviles MD          Follow-up Information       Follow up With Specialties Details Why Contact Info    Sofi Kwok MD Hospitalist, Internal Medicine   [address]   [address]  [facility] [address]  [phone]               Hernan Aviles MD  12/09/23 0149

## 2023-12-10 LAB — BACTERIA UR CULT: ABNORMAL

## 2024-04-15 ENCOUNTER — TELEPHONE (OUTPATIENT)
Dept: NEUROLOGY | Facility: CLINIC | Age: 59
End: 2024-04-15
Payer: COMMERCIAL

## 2024-04-15 NOTE — TELEPHONE ENCOUNTER
----- Message from Radha Dumont sent at 4/15/2024  1:21 PM CDT -----  Type: Needs Medical Advice  Who Called: pt  Best Call Back Number: [phone]    Additional Information: Pt is calling the office needs to be seen for numbness in fingers.Please call back and advise.

## 2024-04-15 NOTE — TELEPHONE ENCOUNTER
Spoke to the pt, she is scheduled to see orthopedics tomorrow for possible CTS.  She will call back to schedule if ortho thinks it is necessary.

## 2024-04-16 ENCOUNTER — OFFICE VISIT (OUTPATIENT)
Dept: ORTHOPEDICS | Facility: CLINIC | Age: 59
End: 2024-04-16
Payer: COMMERCIAL

## 2024-04-16 DIAGNOSIS — R29.898 WEAKNESS OF BOTH HANDS: ICD-10-CM

## 2024-04-16 DIAGNOSIS — G56.01 RIGHT CARPAL TUNNEL SYNDROME: Primary | ICD-10-CM

## 2024-04-16 PROCEDURE — 99204 OFFICE O/P NEW MOD 45 MIN: CPT | Mod: S$GLB,,, | Performed by: PHYSICIAN ASSISTANT

## 2024-04-16 PROCEDURE — 4010F ACE/ARB THERAPY RXD/TAKEN: CPT | Mod: CPTII,S$GLB,, | Performed by: PHYSICIAN ASSISTANT

## 2024-04-16 PROCEDURE — 1160F RVW MEDS BY RX/DR IN RCRD: CPT | Mod: CPTII,S$GLB,, | Performed by: PHYSICIAN ASSISTANT

## 2024-04-16 PROCEDURE — 99999 PR PBB SHADOW E&M-EST. PATIENT-LVL III: CPT | Mod: PBBFAC,,, | Performed by: PHYSICIAN ASSISTANT

## 2024-04-16 PROCEDURE — 1159F MED LIST DOCD IN RCRD: CPT | Mod: CPTII,S$GLB,, | Performed by: PHYSICIAN ASSISTANT

## 2024-04-16 RX ORDER — MELOXICAM 15 MG/1
15 TABLET ORAL DAILY
Qty: 28 TABLET | Refills: 0 | Status: SHIPPED | OUTPATIENT
Start: 2024-04-16

## 2024-04-16 NOTE — PROGRESS NOTES
2024    Chief Complaint:  Chief Complaint   Patient presents with    Right Hand - Pain, Numbness, Swelling       HPI:  Shawn Wagner is a 59 y.o. female, who presents to clinic today for evaluation of her right index finger numbness, pain, and swelling.  States symptoms have been present for the past 8 weeks.  States he has had weakness of the bilateral hands of the past few years.  States he does have nocturnal paresthesias that wake her at night.  Denies any acute injuries.  Denies any other complaints this time.    PMHX:  Past Medical History:   Diagnosis Date    Diabetes mellitus     Hypertension        PSHX:  Past Surgical History:   Procedure Laterality Date     SECTION         FMHX:  Family History   Problem Relation Name Age of Onset    Diabetes Mother      Breast cancer Maternal Aunt      Cancer Neg Hx      Colon cancer Neg Hx      Ovarian cancer Neg Hx         SOCHX:  Social History     Tobacco Use    Smoking status: Never    Smokeless tobacco: Never   Substance Use Topics    Alcohol use: Yes       ALLERGIES:  Patient has no known allergies.    CURRENT MEDICATIONS:  Current Outpatient Medications on File Prior to Visit   Medication Sig Dispense Refill    BYDUREON BCISE 2 mg/0.85 mL AtIn SMARTSI Milligram(s) Topical Once a Week      clotrimazole-betamethasone 1-0.05% (LOTRISONE) cream Apply topically 2 (two) times daily. 1 Tube 0    hydroCHLOROthiazide (MICROZIDE) 12.5 mg capsule 1 cap(s)      lisinopriL (PRINIVIL,ZESTRIL) 40 MG tablet 1 tab(s)      lisinopriL-hydrochlorothiazide (PRINZIDE,ZESTORETIC) 20-25 mg Tab Take 1 tablet by mouth.      metFORMIN (GLUCOPHAGE-XR) 750 MG ER 24hr tablet Take 750 mg by mouth once daily.      semaglutide (OZEMPIC) 0.25 mg or 0.5 mg(2 mg/1.5 mL) pen injector Inject 0.25 mg into the skin.      atorvastatin (LIPITOR) 10 MG tablet Take 10 mg by mouth.       No current facility-administered medications on file prior to visit.       REVIEW OF  SYSTEMS:  Review of Systems   Constitutional: Negative.    HENT: Negative.     Eyes: Negative.    Respiratory: Negative.     Cardiovascular: Negative.    Gastrointestinal: Negative.    Genitourinary: Negative.    Musculoskeletal:  Positive for joint pain.   Skin: Negative.    Neurological:  Positive for tingling and weakness.   Endo/Heme/Allergies: Negative.    Psychiatric/Behavioral: Negative.       GENERAL PHYSICAL EXAM:   There were no vitals taken for this visit.   GEN: well developed, well nourished, no acute distress   HENT: Normocephalic, atraumatic   EYES: No discharge, conjunctiva normal   NECK: Supple, non-tender   PULM: No wheezing, no respiratory distress   CV: RRR   ABD: Soft, non-tender    ORTHO EXAM:   Examination of the bilateral hands/wrist reveals no edema, erythema, ecchymosis, or skin breakdown.  Able to flex extend the wrist appropriately.  Able make composite fist and fully extend all fingers.  No significant tenderness palpation throughout the bilateral hands.  4/5 /intrinsic strength.  4/5 thenar strength.  4/5 hypothenar strength.  4/5 thumb adduction strength.  Positive carpal Tinel's test.  Positive Durkan's test.  Reduced sensation of the right index finger.  Normal sensation in the remainder of the radial, ulnar, median nerve distributions.  Capillary refill less than 2 seconds.      RADIOLOGY:   None.    ASSESSMENT:   Bilateral carpal tunnel syndrome    PLAN:  1. I discussed with Shawn Wagner the carpal tunnel syndrome pathology and treatment options in detail during today's visit after treatment options were discussed, we decided the best course of action this time is to proceed with nighttime splinting via the bilateral carpal tunnel splints and to perform an EMG nerve conduction study to evaluate the severity of her bilateral carpal tunnel syndrome.  We also discussed would perform a course of oral anti-inflammatories via Mobic.  She verbally agreed with the treatment plan      2. She was scheduled for an EMG nerve conduction study with [facility] in clinic today.      3. She was prescribed Mobic 15 mg to be taken once daily.  She was instructed to discontinue medication for any adverse effects.  She was instructed to not take any other type of NSAIDs while taking this medication she verbalized understanding     4. She was provided bilateral carpal tunnel splints in clinic today.  She was instructed to with wear them every night until seen again in clinic.  She verbalized understanding     5. I would like him to follow up in clinic after the EMG to discuss the results.  She was instructed to contact clinic for any problems or concerns in the interim.

## 2024-04-18 ENCOUNTER — TELEPHONE (OUTPATIENT)
Dept: ORTHOPEDICS | Facility: CLINIC | Age: 59
End: 2024-04-18
Payer: COMMERCIAL

## 2024-04-18 NOTE — TELEPHONE ENCOUNTER
Called and spoke with patient regarding the increased selling in her fingers.  In explained this is normal seeing how she has stared wearing the nighttime CT splints.  I stated to sontinue this and take he mobic.  Pt verbalized understanding.

## 2024-04-18 NOTE — TELEPHONE ENCOUNTER
----- Message from Falguni Tian MA sent at 4/18/2024  9:17 AM CDT -----  Contact: pt  Still waking up  with swollen fingers, due to brace     Call back

## 2024-04-23 ENCOUNTER — TELEPHONE (OUTPATIENT)
Dept: ORTHOPEDICS | Facility: CLINIC | Age: 59
End: 2024-04-23
Payer: COMMERCIAL

## 2024-04-23 DIAGNOSIS — G56.01 RIGHT CARPAL TUNNEL SYNDROME: Primary | ICD-10-CM

## 2024-04-23 RX ORDER — GABAPENTIN 100 MG/1
100 CAPSULE ORAL NIGHTLY
Qty: 30 CAPSULE | Refills: 0 | Status: SHIPPED | OUTPATIENT
Start: 2024-04-23 | End: 2024-05-13 | Stop reason: SDUPTHER

## 2024-04-23 NOTE — TELEPHONE ENCOUNTER
Called and spoke with patient.  Informed her that we will send over Gabapentin and to only take at night.  Patient verbally understood and agreed.

## 2024-04-23 NOTE — TELEPHONE ENCOUNTER
----- Message from Neo Galindo sent at 4/23/2024  3:02 PM CDT -----  Patient states that her pain  medication isn't working and would like something stronger called in      [facility] - [place] - [address] [address]  [address]  [place] [address]  Phone: [phone] Fax: [phone]    Please call  [phone]

## 2024-04-23 NOTE — TELEPHONE ENCOUNTER
----- Message from Amina Franco MA sent at 4/23/2024  3:16 PM CDT -----  Please fill  ----- Message -----  From: Neo Galindo  Sent: 4/23/2024   3:03 PM CDT  To: Joseph Mccarthyy Staff    Patient states that her pain  medication isn't working and would like something stronger called in      [facility] [address] [place], [place] [address] - [address] [email or web address]  [address] [facility] [address]  Phone: [phone] Fax: [phone]    Please call  [phone]

## 2024-05-13 ENCOUNTER — OFFICE VISIT (OUTPATIENT)
Dept: ORTHOPEDICS | Facility: CLINIC | Age: 59
End: 2024-05-13
Payer: COMMERCIAL

## 2024-05-13 VITALS — HEIGHT: 67 IN | BODY MASS INDEX: 28.23 KG/M2 | WEIGHT: 179.88 LBS

## 2024-05-13 DIAGNOSIS — G56.01 RIGHT CARPAL TUNNEL SYNDROME: Primary | ICD-10-CM

## 2024-05-13 PROCEDURE — 1160F RVW MEDS BY RX/DR IN RCRD: CPT | Mod: CPTII,S$GLB,, | Performed by: PHYSICIAN ASSISTANT

## 2024-05-13 PROCEDURE — 3008F BODY MASS INDEX DOCD: CPT | Mod: CPTII,S$GLB,, | Performed by: PHYSICIAN ASSISTANT

## 2024-05-13 PROCEDURE — 1159F MED LIST DOCD IN RCRD: CPT | Mod: CPTII,S$GLB,, | Performed by: PHYSICIAN ASSISTANT

## 2024-05-13 PROCEDURE — 4010F ACE/ARB THERAPY RXD/TAKEN: CPT | Mod: CPTII,S$GLB,, | Performed by: PHYSICIAN ASSISTANT

## 2024-05-13 PROCEDURE — 20526 THER INJECTION CARP TUNNEL: CPT | Mod: RT,S$GLB,, | Performed by: PHYSICIAN ASSISTANT

## 2024-05-13 PROCEDURE — 99213 OFFICE O/P EST LOW 20 MIN: CPT | Mod: 25,S$GLB,, | Performed by: PHYSICIAN ASSISTANT

## 2024-05-13 PROCEDURE — 99999 PR PBB SHADOW E&M-EST. PATIENT-LVL III: CPT | Mod: PBBFAC,,, | Performed by: PHYSICIAN ASSISTANT

## 2024-05-13 PROCEDURE — 3044F HG A1C LEVEL LT 7.0%: CPT | Mod: CPTII,S$GLB,, | Performed by: PHYSICIAN ASSISTANT

## 2024-05-13 RX ORDER — TRIAMCINOLONE ACETONIDE 40 MG/ML
40 INJECTION, SUSPENSION INTRA-ARTICULAR; INTRAMUSCULAR
Status: DISCONTINUED | OUTPATIENT
Start: 2024-05-13 | End: 2024-05-13 | Stop reason: HOSPADM

## 2024-05-13 RX ORDER — GABAPENTIN 100 MG/1
100 CAPSULE ORAL NIGHTLY
Qty: 30 CAPSULE | Refills: 0 | Status: SHIPPED | OUTPATIENT
Start: 2024-05-13 | End: 2024-06-12

## 2024-05-13 RX ADMIN — TRIAMCINOLONE ACETONIDE 40 MG: 40 INJECTION, SUSPENSION INTRA-ARTICULAR; INTRAMUSCULAR at 04:05

## 2024-05-13 NOTE — PROGRESS NOTES
2024    HPI:  Shawn Wagner is a 59 y.o. female, who presents to clinic today for  continued evaluation of her bilateral carpal tunnel syndrome and to discuss her EMG results.  States taking the gabapentin wearing the nighttime splints has significantly improved her symptoms.  Denies any acute injuries since last visit.  Denies any other complaints at this time.    PMHX:  Past Medical History:   Diagnosis Date    Diabetes mellitus     Hypertension        PSHX:  Past Surgical History:   Procedure Laterality Date     SECTION         FMHX:  Family History   Problem Relation Name Age of Onset    Diabetes Mother      Breast cancer Maternal Aunt      Cancer Neg Hx      Colon cancer Neg Hx      Ovarian cancer Neg Hx         SOCHX:  Social History     Tobacco Use    Smoking status: Never    Smokeless tobacco: Never   Substance Use Topics    Alcohol use: Yes       ALLERGIES:  Patient has no known allergies.    CURRENT MEDICATIONS:  Current Outpatient Medications on File Prior to Visit   Medication Sig Dispense Refill    atorvastatin (LIPITOR) 10 MG tablet Take 10 mg by mouth.      BYDUREON BCISE 2 mg/0.85 mL AtIn SMARTSI Milligram(s) Topical Once a Week      clotrimazole-betamethasone 1-0.05% (LOTRISONE) cream Apply topically 2 (two) times daily. 1 Tube 0    gabapentin (NEURONTIN) 100 MG capsule Take 1 capsule (100 mg total) by mouth every evening. 30 capsule 0    hydroCHLOROthiazide (MICROZIDE) 12.5 mg capsule 1 cap(s)      lisinopriL (PRINIVIL,ZESTRIL) 40 MG tablet 1 tab(s)      lisinopriL-hydrochlorothiazide (PRINZIDE,ZESTORETIC) 20-25 mg Tab Take 1 tablet by mouth.      meloxicam (MOBIC) 15 MG tablet Take 1 tablet (15 mg total) by mouth once daily. 28 tablet 0    metFORMIN (GLUCOPHAGE-XR) 750 MG ER 24hr tablet Take 750 mg by mouth once daily.      semaglutide (OZEMPIC) 0.25 mg or 0.5 mg(2 mg/1.5 mL) pen injector Inject 0.25 mg into the skin.       No current facility-administered medications on file  "prior to visit.       REVIEW OF SYSTEMS:  Review of Systems Complete; Negative, unless noted above.    GENERAL PHYSICAL EXAM:   Ht 5' 7" (1.702 m)   Wt 81.6 kg (179 lb 14.3 oz)   BMI 28.18 kg/m²    GEN: well developed, well nourished, no acute distress   PULM: No wheezing, no respiratory distress   CV: RRR    ORTHO EXAM:     Examination of the right hand/wrist reveals no edema, erythema, ecchymosis, or skin breakdown.  Able make composite fist and fully extend all fingers.  Sensation is grossly intact in the radial, ulnar, median nerve distributions.  Capillary refill less than 2 seconds.    RADIOLOGY:     None.    EMG:     EMG nerve conduction study of the bilateral upper extremities showed a normal electrodiagnostic study.  EMG showed no electrodiagnostic evidence of mononeuropathies of the bilateral upper extremities.    ASSESSMENT:     Bilateral carpal tunnel syndrome versus cervical radiculopathy    PLAN:  1. I discussed with Shawn Wagner  that considering the results of her EMG nerve conduction study that the best course of action this time we would be perform both a diagnostic and therapeutic steroid carpal tunnel injection in clinic today.  We discussed we would proceed with a right carpal tunnel injection in clinic today.  She verbally agreed with the treatment plan     2.  Informed consent was obtained .  After an alcohol prep followed by a chlorhexidine prep, a steroid injection was placed into the right carpal tunnel.  She tolerated the procedure well with no immediate complications.    3.  I would like to have her follow up in clinic in 3 weeks for repeat evaluation.  She was instructed to contact clinic for any problems or concerns in the interim.       "

## 2024-05-13 NOTE — PROCEDURES
Carpal Tunnel    Date/Time: 5/13/2024 4:20 PM    Performed by: Lorne Ruiz PA-C  Authorized by: Lorne Ruiz PA-C    Consent Done?:  Yes (Verbal)  Indications:  Pain  Site marked: the procedure site was marked    Timeout: prior to procedure the correct patient, procedure, and site was verified    Prep: patient was prepped and draped in usual sterile fashion      Local anesthesia used?: Yes    Local anesthetic:  Lidocaine 1% without epinephrine  Anesthetic total (ml):  0.5    Location:  Wrist  Site:  R carpal tunnel  Ultrasonic Guidance for Needle Placement?: No    Needle size:  25 G  Approach:  Volar  Medications:  40 mg triamcinolone acetonide 40 mg/mL (20 mg injected)  Patient tolerance:  Patient tolerated the procedure well with no immediate complications

## 2024-06-03 ENCOUNTER — OFFICE VISIT (OUTPATIENT)
Dept: ORTHOPEDICS | Facility: CLINIC | Age: 59
End: 2024-06-03
Payer: COMMERCIAL

## 2024-06-03 VITALS — HEIGHT: 67 IN | WEIGHT: 179.88 LBS | BODY MASS INDEX: 28.23 KG/M2

## 2024-06-03 DIAGNOSIS — M54.12 CERVICAL RADICULOPATHY: ICD-10-CM

## 2024-06-03 DIAGNOSIS — G56.01 RIGHT CARPAL TUNNEL SYNDROME: Primary | ICD-10-CM

## 2024-06-03 PROCEDURE — 99213 OFFICE O/P EST LOW 20 MIN: CPT | Mod: S$GLB,,, | Performed by: PHYSICIAN ASSISTANT

## 2024-06-03 PROCEDURE — 1159F MED LIST DOCD IN RCRD: CPT | Mod: CPTII,S$GLB,, | Performed by: PHYSICIAN ASSISTANT

## 2024-06-03 PROCEDURE — 99999 PR PBB SHADOW E&M-EST. PATIENT-LVL III: CPT | Mod: PBBFAC,,, | Performed by: PHYSICIAN ASSISTANT

## 2024-06-03 PROCEDURE — 4010F ACE/ARB THERAPY RXD/TAKEN: CPT | Mod: CPTII,S$GLB,, | Performed by: PHYSICIAN ASSISTANT

## 2024-06-03 PROCEDURE — 1160F RVW MEDS BY RX/DR IN RCRD: CPT | Mod: CPTII,S$GLB,, | Performed by: PHYSICIAN ASSISTANT

## 2024-06-03 PROCEDURE — 3044F HG A1C LEVEL LT 7.0%: CPT | Mod: CPTII,S$GLB,, | Performed by: PHYSICIAN ASSISTANT

## 2024-06-03 NOTE — PROGRESS NOTES
6/3/2024    HPI:  Shawn Wagner is a 59 y.o. female, who presents to clinic today for continued evaluation of her right carpal tunnel syndrome.  States the injection she received approximately 3 weeks ago has significantly improved her symptoms.  States he does continues to have numbness of the tip of the right index finger.  States she no longer needs to take gabapentin, and that the pain has significantly improved.  Denies any acute injuries since her last visit.  States she was occasionally gets some numbness and tingling that goes into her shoulder.  Denies any other complaints at this time.    PMHX:  Past Medical History:   Diagnosis Date    Diabetes mellitus     Hypertension        PSHX:  Past Surgical History:   Procedure Laterality Date     SECTION         FMHX:  Family History   Problem Relation Name Age of Onset    Diabetes Mother      Breast cancer Maternal Aunt      Cancer Neg Hx      Colon cancer Neg Hx      Ovarian cancer Neg Hx         SOCHX:  Social History     Tobacco Use    Smoking status: Never    Smokeless tobacco: Never   Substance Use Topics    Alcohol use: Yes       ALLERGIES:  Patient has no known allergies.    CURRENT MEDICATIONS:  Current Outpatient Medications on File Prior to Visit   Medication Sig Dispense Refill    BYDUREON BCISE 2 mg/0.85 mL AtIn SMARTSI Milligram(s) Topical Once a Week      clotrimazole-betamethasone 1-0.05% (LOTRISONE) cream Apply topically 2 (two) times daily. 1 Tube 0    gabapentin (NEURONTIN) 100 MG capsule Take 1 capsule (100 mg total) by mouth every evening. 30 capsule 0    hydroCHLOROthiazide (MICROZIDE) 12.5 mg capsule 1 cap(s)      lisinopriL (PRINIVIL,ZESTRIL) 40 MG tablet 1 tab(s)      lisinopriL-hydrochlorothiazide (PRINZIDE,ZESTORETIC) 20-25 mg Tab Take 1 tablet by mouth.      meloxicam (MOBIC) 15 MG tablet Take 1 tablet (15 mg total) by mouth once daily. 28 tablet 0    metFORMIN (GLUCOPHAGE-XR) 750 MG ER 24hr tablet Take 750 mg by mouth  "once daily.      semaglutide (OZEMPIC) 0.25 mg or 0.5 mg(2 mg/1.5 mL) pen injector Inject 0.25 mg into the skin.      atorvastatin (LIPITOR) 10 MG tablet Take 10 mg by mouth.       No current facility-administered medications on file prior to visit.       REVIEW OF SYSTEMS:  Review of Systems Complete; Negative, unless noted above.    GENERAL PHYSICAL EXAM:   Ht 5' 7" (1.702 m)   Wt 81.6 kg (179 lb 14.3 oz)   BMI 28.18 kg/m²    GEN: well developed, well nourished, no acute distress   PULM: No wheezing, no respiratory distress   CV: RRR    ORTHO EXAM:   Examination of the right hand/wrist reveals no edema, erythema, ecchymosis, or skin breakdown.  Able make composite fist and fully extend all fingers.  Negative carpal Tinel's test.  Positive Durkan's test.  Sensation is grossly intact in the radial, ulnar, median nerve distributions.  Capillary refill less than 2 seconds.    RADIOLOGY:   None.    ASSESSMENT:   Right carpal tunnel syndrome with questionable superimposed cervical radiculopathy    PLAN:  1. I discussed with Shawn Wagner that considering the therapeutic/diagnostic carpal tunnel injection provided significant relief, but not total relief that the best course of action this time is to have her follow up with back and spine to evaluate her cervical spine.  We also discussed for any returning/worsening of her symptoms to follow up with Dr. Gillis to discuss the possibility of surgical intervention.  She verbally agreed with the treatment plan    2. She was referred to back and spine in clinic today.    3. I would like her follow up in clinic on a p.r.n. basis for any worsening of his symptoms or for any hand, wrist, elbow problems/concerns.  She was instructed to contact the clinic for any problems or concerns in the interim.      "

## 2024-07-02 ENCOUNTER — OFFICE VISIT (OUTPATIENT)
Dept: PAIN MEDICINE | Facility: CLINIC | Age: 59
End: 2024-07-02
Payer: COMMERCIAL

## 2024-07-02 ENCOUNTER — HOSPITAL ENCOUNTER (OUTPATIENT)
Dept: RADIOLOGY | Facility: HOSPITAL | Age: 59
Discharge: HOME | End: 2024-07-02
Attending: STUDENT IN AN ORGANIZED HEALTH CARE EDUCATION/TRAINING PROGRAM
Payer: COMMERCIAL

## 2024-07-02 VITALS — WEIGHT: 179 LBS | BODY MASS INDEX: 28.09 KG/M2 | HEIGHT: 67 IN

## 2024-07-02 DIAGNOSIS — M54.12 CERVICAL RADICULOPATHY: ICD-10-CM

## 2024-07-02 DIAGNOSIS — M54.2 NECK PAIN: Primary | ICD-10-CM

## 2024-07-02 DIAGNOSIS — M54.2 NECK PAIN: ICD-10-CM

## 2024-07-02 PROCEDURE — 72040 X-RAY EXAM NECK SPINE 2-3 VW: CPT | Mod: 26,,, | Performed by: RADIOLOGY

## 2024-07-02 PROCEDURE — 3008F BODY MASS INDEX DOCD: CPT | Mod: CPTII,S$GLB,, | Performed by: STUDENT IN AN ORGANIZED HEALTH CARE EDUCATION/TRAINING PROGRAM

## 2024-07-02 PROCEDURE — 72040 X-RAY EXAM NECK SPINE 2-3 VW: CPT | Mod: TC

## 2024-07-02 PROCEDURE — 1159F MED LIST DOCD IN RCRD: CPT | Mod: CPTII,S$GLB,, | Performed by: STUDENT IN AN ORGANIZED HEALTH CARE EDUCATION/TRAINING PROGRAM

## 2024-07-02 PROCEDURE — 99999 PR PBB SHADOW E&M-EST. PATIENT-LVL IV: CPT | Mod: PBBFAC,,, | Performed by: STUDENT IN AN ORGANIZED HEALTH CARE EDUCATION/TRAINING PROGRAM

## 2024-07-02 PROCEDURE — 99203 OFFICE O/P NEW LOW 30 MIN: CPT | Mod: S$GLB,,, | Performed by: STUDENT IN AN ORGANIZED HEALTH CARE EDUCATION/TRAINING PROGRAM

## 2024-07-02 PROCEDURE — 3044F HG A1C LEVEL LT 7.0%: CPT | Mod: CPTII,S$GLB,, | Performed by: STUDENT IN AN ORGANIZED HEALTH CARE EDUCATION/TRAINING PROGRAM

## 2024-07-02 PROCEDURE — 4010F ACE/ARB THERAPY RXD/TAKEN: CPT | Mod: CPTII,S$GLB,, | Performed by: STUDENT IN AN ORGANIZED HEALTH CARE EDUCATION/TRAINING PROGRAM

## 2024-07-02 PROCEDURE — 1160F RVW MEDS BY RX/DR IN RCRD: CPT | Mod: CPTII,S$GLB,, | Performed by: STUDENT IN AN ORGANIZED HEALTH CARE EDUCATION/TRAINING PROGRAM

## 2024-07-02 NOTE — PROGRESS NOTES
[place] - Department    Sofi Kwok MD      First Office Visit: 7/2/24  Today' Date: 7/2/2024  Last Office Visit: None    Chief complaint: neck pain     HPI: Pt is a pleasant 59 y.o., who presents for evaluation. Referred by Lorne Ruiz PA-C. Pt complains of neck tingling and bilateral arm numbness/tingling. Pt endorses having L>R sided tingling from the neck to the R shoulder and R index finger numbness. States recently, she started experiencing pins and needles sensation going down the L arm to her fingers. No headaches, no problems dropping objects, no balance issues. No BB changes. Is not interested in PT and would prefer HEP.         Pain disability index score: 63  Pain score: 5    Relevant Imaging/ Testing: None    Procedures: None    Date of board of pharmacy review:7/2/2024  Date of opioid risk screening/ pain psych: None  Date of opioid agreement and consent: None  Date of urine drug screen: None  Date of random pill count: None     was reviewed today: reviewed, no concerns     Prescribed medications: None    See EHR for  PMH, PSH, FH, SH, Medications and Allergy    ROS:  Positive for pain  ROS     PE:  There were no vitals filed for this visit.  General: Pleasant, no distress  HEENT: NC/ AT. PERRLA  CV: Radial pulses intact  Pulm: No distress  Ext: No edema    Physical Exam     Neuromusculoskeletal:  Head: NC, AT. PERRLA. No occipital tenderness  Neck: Intact range of motions, extension, flexion, rotation. Neg Facet loading. Neg Spurling. Min Tenderness.  5/5 Strength, normal tone. Neg Nieves's  Shoulder: Intact range of motion  Lumbar: Intact range of motion  Hip: Intact range of motion  SI: Level  Knee: Intact range of motion  Reflexes: normal Bicep  Strength: 5/5 globally   Sensory: Grossly intact   Skin: No bruising, erythema  Gait: Normal      Impression:  Neck pain (L>R)   Bilat arm numbness/tingling   Relevant History  BMI 28.04          Plan:  Discussed options  Imaging/  relevant records viewed/ reviewed/ discussed  Imaging results viewed and reviewed (noted above)/ reviewed with patient   reviewed  HEP provided  XR C-spine  Re-eval after  MR C-spine if pain persists  Consider ILESI C7-T1      Prescribed medications:  1. None    The impression and plan were discussed and explained in detail. All the questions were answered. Education was provided accordingly.         Follow-up:  6 wks or sooner if needed    Maddie Aemzcua MD

## 2024-08-23 ENCOUNTER — TELEPHONE (OUTPATIENT)
Dept: PAIN MEDICINE | Facility: CLINIC | Age: 59
End: 2024-08-23

## 2024-08-23 ENCOUNTER — OFFICE VISIT (OUTPATIENT)
Dept: PAIN MEDICINE | Facility: CLINIC | Age: 59
End: 2024-08-23
Payer: COMMERCIAL

## 2024-08-23 VITALS — WEIGHT: 179 LBS | BODY MASS INDEX: 28.09 KG/M2 | HEIGHT: 67 IN

## 2024-08-23 DIAGNOSIS — M54.12 CERVICAL RADICULOPATHY: Primary | ICD-10-CM

## 2024-08-23 DIAGNOSIS — M54.2 CERVICALGIA: ICD-10-CM

## 2024-08-23 PROCEDURE — 99999 PR PBB SHADOW E&M-EST. PATIENT-LVL IV: CPT | Mod: PBBFAC,,, | Performed by: STUDENT IN AN ORGANIZED HEALTH CARE EDUCATION/TRAINING PROGRAM

## 2024-08-23 NOTE — PROGRESS NOTES
[place] - Department    Sofi Edgar MD      First Office Visit: 7/2/24  Today' Date: 8/23/2024  Last Office Visit: 7/2/24    Chief complaint: neck pain     HPI: Pt is a pleasant 59 y.o., who presents for evaluation. Referred by Lorne Ruiz PA-C. Pt seen previously for neck tingling and bilateral arm numbness/tingling.  Patient is seen today for 6 week follow-up.  States she has right-sided tingling sensation that goes up from the right shoulder to her neck.  Previously patient had started experiencing pins and needles sensation going down the L arm to her fingers. No headaches, no problems dropping objects, no balance issues. No BB changes. Is doing HEP.         Pain disability index score: 63  Pain score: 5    Relevant Imaging/ Testing:   X-ray C-spine 7/24    Procedures: None    Date of board of pharmacy review:8/23/2024  Date of opioid risk screening/ pain psych: None  Date of opioid agreement and consent: None  Date of urine drug screen: None  Date of random pill count: None     was reviewed today: reviewed, no concerns     Prescribed medications: None    See EHR for  PMH, PSH, FH, SH, Medications and Allergy    ROS:  Positive for pain  ROS     PE:  There were no vitals filed for this visit.  General: Pleasant, no distress  HEENT: NC/ AT. PERRLA  CV: Radial pulses intact  Pulm: No distress  Ext: No edema    Physical Exam     Neuromusculoskeletal:  Head: NC, AT. PERRLA. No occipital tenderness  Neck: Intact range of motions, extension, flexion, rotation. Neg Facet loading. Neg Spurling. Min Tenderness.  5/5 Strength, normal tone. Neg Nieves's  Shoulder: Intact range of motion  Lumbar: Intact range of motion  Hip: Intact range of motion  SI: Level  Knee: Intact range of motion  Reflexes: normal Bicep  Strength: 5/5 globally   Sensory: Grossly intact   Skin: No bruising, erythema  Gait: Normal      Impression:  Neck pain (L>R)   Bilat arm numbness/tingling   Cervical radiculopathy   Relevant  History  BMI 28.04          Plan:  Discussed options  Imaging/ relevant records viewed/ reviewed/ discussed  Imaging results viewed and reviewed (noted above)/ reviewed with patient   reviewed  Cont HEP  MR C-spine   ILESI C7-T1  Re-eval after  Consider B MBB C5-6 and C6-7       Prescribed medications:  1. None    The impression and plan were discussed and explained in detail. All the questions were answered. Education was provided accordingly.     The procedure was explained in detail, along with risks and potential side effects.      Follow-up:  For procedure     Maddie Amezcua MD

## 2024-08-23 NOTE — TELEPHONE ENCOUNTER
Types of orders made on 08/23/2024: Procedure Request      Order Date:8/23/2024   Ordering User:AMISH LEIJA [[id number]]   Encounter Provider:Amish Leija MD [[id number]]   Authorizing    Provider: Amish Leija MD [[id number]]   Department:[facility] PAIN MANAGEMENT[[id number]]      Common Order Information   Procedure -> Epidural Injection (specify level) Cmt: ILESI C7-T1      Order Specific Information   Order: Procedure Order to Pain Management [Custom: [id number]]  Order #:          [id number]: 1 FUTURE     Priority: Routine  Class: Clinic Performed     Future Order Information       Expires on:08 /23/2025            Expected by:08/23/2024                   Associated Diagnoses       M54.12 Cervical radiculopathy       Facility Name: -> [facility]          Follow-up: -> 2 weeks              Priority: Routine  Class: Clinic Performed     Future Order Information       Expires on:08/23/2025            Expected by:08/23/2024                   Associated Diagnoses       M54.12 Cervical radiculopathy       Procedure ->   Epidural Injection (specify level) Cmt: ILESI C7-T1          Facility Name: -> [facility]

## 2024-10-03 ENCOUNTER — TELEPHONE (OUTPATIENT)
Dept: PAIN MEDICINE | Facility: CLINIC | Age: 59
End: 2024-10-03
Payer: COMMERCIAL

## 2024-10-03 DIAGNOSIS — M54.12 CERVICAL RADICULOPATHY: Primary | ICD-10-CM

## 2024-10-03 RX ORDER — GABAPENTIN 300 MG/1
300 CAPSULE ORAL 3 TIMES DAILY
Qty: 90 CAPSULE | Refills: 11 | Status: SHIPPED | OUTPATIENT
Start: 2024-10-03 | End: 2025-10-03

## 2024-10-03 NOTE — TELEPHONE ENCOUNTER
----- Message from Herminia sent at 10/3/2024 12:11 PM CDT -----  Contact: self  Type:  Needs Medical Advice    Who Called: self  Symptoms (please be specific): trying to see of pt can get something prescribed for the pain she is experiencing radiating down her neck, back and down to the back of her leg.    Pharmacy name and phone #:    [facility] - [place], [place] - [address] [address]  [address] [address] [facility] [address]  Phone: [phone] Fax: [phone]        Would the patient rather a call back or a response via [facility]? call  Best Call Back Number: [phone] (home)     Additional Information: please advise and thank you.

## 2024-10-03 NOTE — TELEPHONE ENCOUNTER
----- Message from Radha sent at 10/3/2024  9:19 AM CDT -----  Type: Needs Medical Advice  Who Called:  pt  Best Call Back Number: [phone]    Additional Information: Pt is calling the office regarding injection on 10/14 just needed to speak with the nurse not going to cancel just needs instructions on what is needed to do on 10/14. Please call and advise.

## 2024-10-03 NOTE — TELEPHONE ENCOUNTER
Last seen 08/23/2024 she does have an epidural on 10/14 with you states that mobic has not worked in the past taking gabapentin as well. Please advise thank you

## 2024-10-14 DIAGNOSIS — Z12.31 ENCOUNTER FOR SCREENING MAMMOGRAM FOR MALIGNANT NEOPLASM OF BREAST: Primary | ICD-10-CM

## 2024-10-15 ENCOUNTER — TELEPHONE (OUTPATIENT)
Dept: PAIN MEDICINE | Facility: CLINIC | Age: 59
End: 2024-10-15
Payer: COMMERCIAL

## 2024-10-15 DIAGNOSIS — M54.12 CERVICAL RADICULOPATHY: Primary | ICD-10-CM

## 2024-10-15 NOTE — TELEPHONE ENCOUNTER
----- Message from Danish sent at 10/15/2024 10:30 AM CDT -----  Regarding: return call  Contact: patient  Type:  Patient Returning Call    Who Called:patient  Who Left Message for Patient:  Does the patient know what this is regarding?:rescheduling injection  Would the patient rather a call back or a response via [facility]?   Best Call Back Number:[phone]  Additional Information:

## 2024-10-18 ENCOUNTER — HOSPITAL ENCOUNTER (OUTPATIENT)
Dept: RADIOLOGY | Facility: HOSPITAL | Age: 59
Discharge: HOME | End: 2024-10-18
Attending: OBSTETRICS & GYNECOLOGY
Payer: COMMERCIAL

## 2024-10-18 DIAGNOSIS — Z12.31 ENCOUNTER FOR SCREENING MAMMOGRAM FOR MALIGNANT NEOPLASM OF BREAST: ICD-10-CM

## 2024-10-18 PROCEDURE — 77063 BREAST TOMOSYNTHESIS BI: CPT | Mod: 26,,, | Performed by: RADIOLOGY

## 2024-10-18 PROCEDURE — 77067 SCR MAMMO BI INCL CAD: CPT | Mod: TC,PO

## 2024-10-18 PROCEDURE — 77067 SCR MAMMO BI INCL CAD: CPT | Mod: 26,,, | Performed by: RADIOLOGY

## 2024-10-27 RX ORDER — LIDOCAINE HYDROCHLORIDE 10 MG/ML
1 INJECTION, SOLUTION EPIDURAL; INFILTRATION; INTRACAUDAL; PERINEURAL ONCE
OUTPATIENT
Start: 2024-10-27 | End: 2024-10-27

## 2024-10-27 RX ORDER — SODIUM CHLORIDE, SODIUM LACTATE, POTASSIUM CHLORIDE, CALCIUM CHLORIDE 600; 310; 30; 20 MG/100ML; MG/100ML; MG/100ML; MG/100ML
INJECTION, SOLUTION INTRAVENOUS CONTINUOUS
OUTPATIENT
Start: 2024-10-27

## 2024-10-28 ENCOUNTER — HOSPITAL ENCOUNTER (OUTPATIENT)
Facility: HOSPITAL | Age: 59
Discharge: HOME | End: 2024-10-28
Attending: STUDENT IN AN ORGANIZED HEALTH CARE EDUCATION/TRAINING PROGRAM | Admitting: STUDENT IN AN ORGANIZED HEALTH CARE EDUCATION/TRAINING PROGRAM
Payer: COMMERCIAL

## 2024-10-28 DIAGNOSIS — M54.12 CERVICAL RADICULOPATHY: Primary | ICD-10-CM

## 2024-10-28 DIAGNOSIS — M54.12 CERVICAL RADICULITIS: ICD-10-CM

## 2024-10-28 LAB — POCT GLUCOSE: 199 MG/DL (ref 70–110)

## 2024-10-28 PROCEDURE — 62321 NJX INTERLAMINAR CRV/THRC: CPT | Mod: ,,, | Performed by: STUDENT IN AN ORGANIZED HEALTH CARE EDUCATION/TRAINING PROGRAM

## 2024-10-28 PROCEDURE — 71000015 HC POSTOP RECOV 1ST HR: Performed by: STUDENT IN AN ORGANIZED HEALTH CARE EDUCATION/TRAINING PROGRAM

## 2024-10-28 PROCEDURE — 63600175 PHARM REV CODE 636 W HCPCS: Performed by: STUDENT IN AN ORGANIZED HEALTH CARE EDUCATION/TRAINING PROGRAM

## 2024-10-28 PROCEDURE — 36000704 HC OR TIME LEV I 1ST 15 MIN: Performed by: STUDENT IN AN ORGANIZED HEALTH CARE EDUCATION/TRAINING PROGRAM

## 2024-10-28 PROCEDURE — 25500020 PHARM REV CODE 255: Performed by: STUDENT IN AN ORGANIZED HEALTH CARE EDUCATION/TRAINING PROGRAM

## 2024-10-28 PROCEDURE — A4216 STERILE WATER/SALINE, 10 ML: HCPCS | Performed by: STUDENT IN AN ORGANIZED HEALTH CARE EDUCATION/TRAINING PROGRAM

## 2024-10-28 PROCEDURE — 62321 NJX INTERLAMINAR CRV/THRC: CPT

## 2024-10-28 PROCEDURE — 25000003 PHARM REV CODE 250: Performed by: STUDENT IN AN ORGANIZED HEALTH CARE EDUCATION/TRAINING PROGRAM

## 2024-10-28 RX ORDER — DEXAMETHASONE SODIUM PHOSPHATE 10 MG/ML
INJECTION INTRAMUSCULAR; INTRAVENOUS
Status: DISCONTINUED | OUTPATIENT
Start: 2024-10-28 | End: 2024-10-28 | Stop reason: HOSPADM

## 2024-10-28 RX ORDER — MIDAZOLAM HYDROCHLORIDE 1 MG/ML
INJECTION INTRAMUSCULAR; INTRAVENOUS
Status: DISCONTINUED | OUTPATIENT
Start: 2024-10-28 | End: 2024-10-28 | Stop reason: HOSPADM

## 2024-10-28 RX ORDER — SODIUM CHLORIDE 9 MG/ML
INJECTION, SOLUTION INTRAMUSCULAR; INTRAVENOUS; SUBCUTANEOUS
Status: DISCONTINUED | OUTPATIENT
Start: 2024-10-28 | End: 2024-10-28 | Stop reason: HOSPADM

## 2024-10-28 RX ORDER — FENTANYL CITRATE 50 UG/ML
INJECTION, SOLUTION INTRAMUSCULAR; INTRAVENOUS
Status: DISCONTINUED | OUTPATIENT
Start: 2024-10-28 | End: 2024-10-28 | Stop reason: HOSPADM

## 2024-10-28 RX ORDER — LIDOCAINE HYDROCHLORIDE 10 MG/ML
INJECTION, SOLUTION INFILTRATION; PERINEURAL
Status: DISCONTINUED | OUTPATIENT
Start: 2024-10-28 | End: 2024-10-28 | Stop reason: HOSPADM

## 2024-10-28 RX ORDER — LIDOCAINE HYDROCHLORIDE 10 MG/ML
INJECTION, SOLUTION EPIDURAL; INFILTRATION; INTRACAUDAL; PERINEURAL
Status: DISCONTINUED | OUTPATIENT
Start: 2024-10-28 | End: 2024-10-28 | Stop reason: HOSPADM

## 2024-10-29 VITALS
BODY MASS INDEX: 28.12 KG/M2 | SYSTOLIC BLOOD PRESSURE: 124 MMHG | WEIGHT: 175 LBS | OXYGEN SATURATION: 99 % | HEART RATE: 64 BPM | DIASTOLIC BLOOD PRESSURE: 75 MMHG | HEIGHT: 66 IN | TEMPERATURE: 98 F | RESPIRATION RATE: 18 BRPM

## 2024-11-08 ENCOUNTER — OFFICE VISIT (OUTPATIENT)
Dept: PAIN MEDICINE | Facility: CLINIC | Age: 59
End: 2024-11-08
Payer: COMMERCIAL

## 2024-11-08 ENCOUNTER — HOSPITAL ENCOUNTER (OUTPATIENT)
Dept: RADIOLOGY | Facility: HOSPITAL | Age: 59
Discharge: HOME | End: 2024-11-08
Attending: STUDENT IN AN ORGANIZED HEALTH CARE EDUCATION/TRAINING PROGRAM
Payer: COMMERCIAL

## 2024-11-08 VITALS — BODY MASS INDEX: 28.12 KG/M2 | HEIGHT: 66 IN | WEIGHT: 175 LBS

## 2024-11-08 DIAGNOSIS — G89.29 CHRONIC PAIN OF BOTH KNEES: ICD-10-CM

## 2024-11-08 DIAGNOSIS — M25.562 CHRONIC PAIN OF BOTH KNEES: Primary | ICD-10-CM

## 2024-11-08 DIAGNOSIS — M25.562 CHRONIC PAIN OF BOTH KNEES: ICD-10-CM

## 2024-11-08 DIAGNOSIS — M25.561 CHRONIC PAIN OF BOTH KNEES: ICD-10-CM

## 2024-11-08 DIAGNOSIS — G89.29 CHRONIC PAIN OF BOTH KNEES: Primary | ICD-10-CM

## 2024-11-08 DIAGNOSIS — M25.561 CHRONIC PAIN OF BOTH KNEES: Primary | ICD-10-CM

## 2024-11-08 PROCEDURE — 73562 X-RAY EXAM OF KNEE 3: CPT | Mod: TC,50

## 2024-11-08 PROCEDURE — 73562 X-RAY EXAM OF KNEE 3: CPT | Mod: 26,50,, | Performed by: RADIOLOGY

## 2024-11-08 PROCEDURE — 3008F BODY MASS INDEX DOCD: CPT | Mod: CPTII,S$GLB,, | Performed by: STUDENT IN AN ORGANIZED HEALTH CARE EDUCATION/TRAINING PROGRAM

## 2024-11-08 PROCEDURE — 3044F HG A1C LEVEL LT 7.0%: CPT | Mod: CPTII,S$GLB,, | Performed by: STUDENT IN AN ORGANIZED HEALTH CARE EDUCATION/TRAINING PROGRAM

## 2024-11-08 PROCEDURE — 1159F MED LIST DOCD IN RCRD: CPT | Mod: CPTII,S$GLB,, | Performed by: STUDENT IN AN ORGANIZED HEALTH CARE EDUCATION/TRAINING PROGRAM

## 2024-11-08 PROCEDURE — 99214 OFFICE O/P EST MOD 30 MIN: CPT | Mod: S$GLB,,, | Performed by: STUDENT IN AN ORGANIZED HEALTH CARE EDUCATION/TRAINING PROGRAM

## 2024-11-08 PROCEDURE — 1160F RVW MEDS BY RX/DR IN RCRD: CPT | Mod: CPTII,S$GLB,, | Performed by: STUDENT IN AN ORGANIZED HEALTH CARE EDUCATION/TRAINING PROGRAM

## 2024-11-08 PROCEDURE — 4010F ACE/ARB THERAPY RXD/TAKEN: CPT | Mod: CPTII,S$GLB,, | Performed by: STUDENT IN AN ORGANIZED HEALTH CARE EDUCATION/TRAINING PROGRAM

## 2024-11-08 PROCEDURE — 99999 PR PBB SHADOW E&M-EST. PATIENT-LVL IV: CPT | Mod: PBBFAC,,, | Performed by: STUDENT IN AN ORGANIZED HEALTH CARE EDUCATION/TRAINING PROGRAM

## 2024-11-08 NOTE — PROGRESS NOTES
[place] - Department    Sofi Edgar MD      First Office Visit: 7/2/24  Today' Date: 11/8/2024  Last Office Visit: 7/2/24    Chief complaint: neck pain     HPI: Pt is a pleasant 59 y.o., who presents for evaluation. Referred by Lorne Ruiz PA-C. Pt seen previously for neck tingling and bilateral arm numbness/tingling.  Patient is seen today for f/u from ILESI C7-T1. States her R sided neck and L arm pain have mostly gone away since the injection. Primary complaint today is bilateral knee pain. Knee pain is mostly localized to the front of the knees and is worse with any movement. Has not had an XR and is open to PT.         Pain disability index score: 63  Pain score: 5    Relevant Imaging/ Testing:   X-ray C-spine 7/24    Procedures:   ILESI C7-T1 10/28/24    Date of board of pharmacy review:11/8/2024  Date of opioid risk screening/ pain psych: None  Date of opioid agreement and consent: None  Date of urine drug screen: None  Date of random pill count: None     was reviewed today: reviewed, no concerns     Prescribed medications: None    See EHR for  PMH, PSH, FH, SH, Medications and Allergy    ROS:  Positive for pain  ROS     PE:  There were no vitals filed for this visit.  General: Pleasant, no distress  HEENT: NC/ AT. PERRLA  CV: Radial pulses intact  Pulm: No distress  Ext: No edema    Physical Exam     Neuromusculoskeletal:  Head: NC, AT. PERRLA. No occipital tenderness  Neck: Intact range of motions, extension, flexion, rotation. Neg Facet loading. Neg Spurling. Min Tenderness.  5/5 Strength, normal tone. Neg Nieves's  Shoulder: Intact range of motion  Lumbar: Intact range of motion  Hip: Intact range of motion  SI: Level  Knee: Intact range of motion, nontender to palpation   Reflexes: normal Bicep, knee   Strength: 5/5 globally   Sensory: Grossly intact   Skin: No bruising, erythema  Gait: Normal      Impression:  Knee pain  Neck pain (L>R)   Bilat arm numbness/tingling   Relevant  History  BMI 28.04    Assessment:  Bilateral knee osteoarthritis  Cervical radiculopathy - sxs resolving with last ALINA       Plan:  Discussed options  Imaging/ relevant records viewed/ reviewed/ discussed  Imaging results viewed and reviewed (noted above)/ reviewed with patient   reviewed  PT trial for knees   Cont HEP  XR B knees  Re-eval after  Consider B knee steroid injection   ILESI C7-T1 prn neck and arm pain         Prescribed medications:  1. None    The impression and plan were discussed and explained in detail. All the questions were answered. Education was provided accordingly.           Follow-up:  6 wks or sooner if needed    Maddie Amezcua MD

## 2024-11-25 ENCOUNTER — CLINICAL SUPPORT (OUTPATIENT)
Dept: REHABILITATION | Facility: HOSPITAL | Age: 59
End: 2024-11-25
Attending: STUDENT IN AN ORGANIZED HEALTH CARE EDUCATION/TRAINING PROGRAM
Payer: COMMERCIAL

## 2024-11-25 DIAGNOSIS — G89.29 CHRONIC PAIN OF BOTH KNEES: ICD-10-CM

## 2024-11-25 DIAGNOSIS — M25.562 CHRONIC PAIN OF BOTH KNEES: ICD-10-CM

## 2024-11-25 DIAGNOSIS — R53.1 DECREASED STRENGTH: Primary | ICD-10-CM

## 2024-11-25 DIAGNOSIS — M25.561 CHRONIC PAIN OF BOTH KNEES: ICD-10-CM

## 2024-11-25 DIAGNOSIS — M62.89 MUSCLE TIGHTNESS: ICD-10-CM

## 2024-11-25 PROCEDURE — 97110 THERAPEUTIC EXERCISES: CPT | Mod: PN

## 2024-11-25 PROCEDURE — 97161 PT EVAL LOW COMPLEX 20 MIN: CPT | Mod: PN

## 2024-11-26 PROBLEM — R53.1 DECREASED STRENGTH: Status: ACTIVE | Noted: 2024-11-26

## 2024-11-26 PROBLEM — M62.89 MUSCLE TIGHTNESS: Status: ACTIVE | Noted: 2024-11-26

## 2024-11-26 NOTE — PLAN OF CARE
[facility]  Physical Therapy Initial Evaluation    Name: Shawn Wagner  [facility] Number: [id number]    Therapy Diagnosis:   Encounter Diagnoses   Name Primary?    Chronic pain of both knees     Decreased strength Yes    Muscle tightness      Physician: Maddie Amezcua MD   Physician Orders: PT Eval and Treat  Medical Diagnosis from Referral: M25.561,M25.562,G89.29 (ICD-10-CM) - Chronic pain of both knees  Evaluation Date: 11/25/2024  Authorization Period Expiration: 11/08/2025  Plan of Care Expiration:  1/30/2025    Progress Update: 12/26/2024  FOTO: 1 / 3   Visit # / Visits authorized: 1 / 1       PRECAUTIONS: Standard Precautions     Time In: 1705  (Pnt arrived late)  Time Out: 1800  Total Appointment Time (timed & untimed codes): 55 minutes    SUBJECTIVE     Chief complaint:  B knee pain   L > R    History of current condition:  Pain started about 2 months ago.   Denies specific incident.   Pain intensity and frequency has been about the same since onset.   Denies buckling, popping, clicking, swelling.   States that pain usually worse first thing in the morning and after sitting for prolonged periods of time while working.       Previous episode:  none    Aggravating Factors:  stairs, squatting, first steps out of bed  Easing Factors:  alleve    Imaging:      Impression:     Mild degenerative change of the knees.        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  Prior Level of Function: Independent with all ADLs  Current Level of Function: 70% of PLOF    Pain:  Current 2 /10, worst 10 /10, best 2 /10   Description: Aching and Dull    Pts goals: Pt reported goal is to be able to walk without pain.      _______________________________________________________  Medical History:   Past Medical History:   Diagnosis Date    Diabetes mellitus     Hypertension        Surgical History:   Shawn Wagner  has a past surgical history that includes  section and Epidural steroid injection into cervical spine (N/A, 10/28/2024).    Medications:   Shawn has a current medication list which includes the following prescription(s): gabapentin, hydrochlorothiazide, lisinopril-hydrochlorothiazide, and metformin.    Allergies:   Review of patient's allergies indicates:  No Known Allergies     OBJECTIVE   (x = not tested due to pain and/or inability to obtain test position)    RANGE OF MOTION:      Hip AROM/PROM Right  2024 Left  2024 Goal   Hip Flexion (120) wfl wfl 115     Hip IR (45) wfl wfl 40     Hip ER (45) wfl wfl 40         Knee AROM/PROM Right  2024 Left  2024 Goal   Hyper - Zero - Flexion wfl Wfl; pain with knee flexion at end range 0-0-135       Ankle/Foot AROM/PROM Right  2024 Left  2024 Goal   Dorsiflexion (20) wfl wfl 15     Plantarflexion (50) wfl wfl 45     Great Toe Extension (35) wfl wfl 30          STRENGTH:    L/E MMT Right  2024 Goal   Hip Flexion  4-/5 5/5 B    Hip Extension  4-/5 5/5 B   Hip Abduction  4-/5 5/5 B   Hip IR 4-/5 5/5 B   Hip ER 4-/5 5/5 B   Knee Flexion 4/5 5/5 B   Knee Extension 4/5 5/5 B   Ankle DF 4/5 5/5 B   Ankle PF 4/5 5/5 B   Ankle Inversion 4/5 5/5 B   Ankle Eversion 4/5 5/5 B   Big Toe Extension 4/5 5/5 B     L/E MMT Left  2024 Goal   Hip Flexion  4-/5 5/5 B    Hip Extension  4-/5 5/5 B   Hip Abduction  4-/5 5/5 B   Hip IR 4-/5 5/5 B   Hip ER 4-/ B   Knee Flexion  B   Knee Extension  B   Ankle DF  B   Ankle PF  B   Ankle Inversion  B   Ankle Eversion  B   Big Toe Extension  B       MUSCLE LENGTH:     Muscle Tested  Right  2024 Left   2024 Goal   Hip Flexors   "decreased decreased Normal B   Quadriceps normal normal Normal B   Hamstrings  decreased decreased Normal B   Piriformis  normal normal Normal B   Gastrocnemius  decreased decreased Normal B   Soleus  decreased decreased Normal B       SPECIAL TESTS:     Right  (spine)  11/25/2024 Left     11/25/2024 Goal   Anterior Drawer Negative Negative Negative B    Posterior Drawer Negative Negative Negative B    Valgus stress test Negative Negative Negative B    Varus stress test Negative Negative Negative B   Thessaly Negative Negative Negative B   Rick Negative Negative Negative B   Bounce Home Negative Negative Negative B   Patellar grind Positive Positive Negative B       Observation:  No edema, ecchymosis noted.   Sensation:  Sensation is intact to light touch  Palpation:  TTP along B medial patellar facets.  Posture:  Pt presents with postural abnormalities which include: forward head, rounded shoulders, and ankle/foot pronation  Gait Analysis: The patient ambulated with the following assistive device: none; the pt presents with the following gait abnormalities:     Movement Analysis:   Functional Test  Outcome   Double Leg Squat    Single Leg Step Down            TREATMENT   Total Treatment time separate from Evaluation: (10) minutes    Shawn received therapeutic exercises to develop strength, endurance, ROM, flexibility, and posture for (10) minutes including: x = exercises performed     TherEx 11/25/2024    Seated hamstring stretch x 3x30 secs   LAQs x 3x10   Bridges x 3x10          Plan for Next Visit:     Recumbent bike x 10 mins      SLRs  2# 3x10  SAQs 4# 3x10  LAQs  3# 3x10  Bridges 3x10  Hip adduction with ball 3x10  DKTC with PB  3x10     Standing heel/toe raises  3x10  TKE with ball  3x10  Seated hamstring stretch 3x30 secs B   Step ups on 6" box, 2 x 10     Precor hamstring curls 55#  3x10  Precor hip abduction 50#  3x10  Precor leg press  40#  3x10     Standing gastroc stretch 3x30 secs B       Home " Exercises and Patient Education Provided    Education/Self-Care provided:   Patient educated on the impairments noted above and the effects of physical therapy intervention to improve overall condition and quality of life.   Patient was educated on all the above exercise prior/during/after for proper posture, positioning, and execution for safe performance with home exercise program.     Written Home Exercises Provided: yes. Prefers: Electronic Copies  Exercises were reviewed and Shawn was able to demonstrate them prior to the end of the session.  Shawn demonstrated good understanding of the education provided.     See EMR under Patient Instructions for exercises provided during therapy sessions.    ASSESSMENT   Shawn is a 59 y.o. female referred to outpatient Physical Therapy with a medical diagnosis of M25.561,M25.562,G89.29 (ICD-10-CM) - Chronic pain of both knees. Shawn presents with clinical signs and symptoms that support this diagnosis with decreased hip girdle, quad, and hamstring Strength, patellar joint hypomobility, increased joint and soft tissue edema, and impaired functional mobility.    The above impairments will be addressed through manual therapy techniques, therapeutic exercises, functional training, and modalities as necessary. Patient was treated and educated on exercises for home program, progression of therapy, and benefits of therapy to achieve full functional mobility. Patient will benefit from skilled physical therapy to meet short and long term goals and return to prior level of function.    Pt prognosis is Good.   Pt will benefit from skilled outpatient Physical Therapy to address the deficits stated above and in the chart below, provide pt/family education, and to maximize pt's level of independence.     Plan of care discussed with patient: Yes  Pt's spiritual, cultural and educational needs considered and patient is agreeable to the plan of care and goals as stated below:  "    Anticipated Barriers for therapy: none    Medical Necessity is demonstrated by the following:   History  Co-morbidities and personal factors that may impact the plan of care Co-morbidities:   advanced age    Personal Factors:   no deficits     low   Examination  Body Structures and Functions, activity limitations and participation restrictions that may impact the plan of care Body Regions:   lower extremities    Body Systems:    gross symmetry  ROM  strength  gross coordinated movement  balance  gait  motor control  motor learning    Participation Restrictions:   See above in "Current Level of Function"     Activity limitations:   Learning and applying knowledge  no deficits    General Tasks and Commands  no deficits    Communication  no deficits    Mobility  no deficits    Self care  no deficits    Domestic Life  no deficits    Interactions/Relationships  no deficits    Life Areas  no deficits    Community and Social Life  community life  recreation and leisure  no deficits         low   Clinical Presentation stable and uncomplicated low   Decision Making/ Complexity Score: low       GOALS:  SHORT TERM GOALS: 4 weeks 11/25/2024   Recent signs and systems trend is improving in order to progress towards LTG's.    Patient will be independent with HEP in order to further progress and return to maximal function.    Pain rating at Worst: 5/10 in order to progress towards increased independence with activity.    Patient will be able to correct postural deviations in sitting and standing, to decrease pain and promote postural awareness for injury prevention.       LONG TERM GOALS: 8 weeks 11/25/2024   Patient will return to normal ADL, recreational, and work related activities with less pain and limitation.     Patient will improve AROM to stated goals in order to return to maximal functional potential.     Patient will improve Strength to stated goals of appropriate musculature in order to improve functional " independence.     Pain Rating at Best: 1/10 to improve Quality of Life.     Patient will meet predicted functional outcome (FOTO) score: 80% to increase self-worth & perceived functional ability.    Patient will have met/partially met personal goal of being able to walk without pain.          PLAN   Plan of care Certification: 11/25/2024 to 1/30/2025    Outpatient Physical Therapy 2 times weekly for 6 weeks to include any combination of the following interventions: virtual visits, dry needling, modalities, electrical stimulation (IFC, Pre-Mod, Attended with Functional Dry Needling), Manual Therapy, Moist Heat/ Ice, Neuromuscular Re-ed, Patient Education, Self Care, Therapeutic Exercise, Functional Training, and Therapeutic Activites     Thank you for this referral.    These services are reasonable and necessary for the conditions set forth above while under my care.    Titus Edwards, PT, DPT

## 2024-12-02 ENCOUNTER — CLINICAL SUPPORT (OUTPATIENT)
Dept: REHABILITATION | Facility: HOSPITAL | Age: 59
End: 2024-12-02
Payer: COMMERCIAL

## 2024-12-02 DIAGNOSIS — R53.1 DECREASED STRENGTH: Primary | ICD-10-CM

## 2024-12-02 DIAGNOSIS — M62.89 MUSCLE TIGHTNESS: ICD-10-CM

## 2024-12-02 PROCEDURE — 97110 THERAPEUTIC EXERCISES: CPT | Mod: PN

## 2024-12-02 PROCEDURE — 97112 NEUROMUSCULAR REEDUCATION: CPT | Mod: PN

## 2024-12-02 PROCEDURE — 97530 THERAPEUTIC ACTIVITIES: CPT | Mod: PN

## 2024-12-02 NOTE — PROGRESS NOTES
"[facility] / [facility]  Physical Therapy Daily Treatment Note     Name: Shawn Wagner  Clinic Number: [id number]    Therapy Diagnosis:   Encounter Diagnoses   Name Primary?    Decreased strength Yes    Muscle tightness      Physician: Maddie Amezcua MD    Visit Date: 12/2/2024    Physician: Maddie Amezcua MD      Physician Orders: PT Eval and Treat  Medical Diagnosis from Referral: M25.561,M25.562,G89.29 (ICD-10-CM) - Chronic pain of both knees  Evaluation Date: 11/25/2024  Authorization Period Expiration: 11/08/2025  Plan of Care Expiration:  1/30/2025               Progress Update: 12/26/2024                      FOTO: 1 / 3   Visit # / Visits authorized: 1 / 12         Time In: 1712  (Pnt arrived late)  Time Out: 1800  Total Billable Time: 48 minutes    Precautions: Standard  Insurance: Payor: [employer] / Plan: [facility] CHOICE PLUS / Product Type: Commercial /     Subjective     Pt reports: that she is about the same as last visit.  She was compliant with home exercise program.  Response to previous treatment: n/a  Functional change: n/a    Pain: 5/10  Location: left knee      Objective     Shawn received therapeutic exercises to develop strength, endurance, ROM, flexibility, posture, and core stabilization for 10 minutes including:    Recumbent bike x 10 mins     Shawn participated in neuromuscular re-education activities to improve: Balance, Coordination, Kinesthetic, Sense, Proprioception, and Posture for 30 minutes. The following activities were included:     SLRs  3x10  SAQs 4# 3x10 - np  LAQs  2# 3x10  Bridges 3x10  Hip adduction with ball 3x10 - np  DKTC with PB  3x10      Standing heel/toe raises  3x10  TKE with ball  3x10  Seated hamstring stretch 3x30 secs B   Step ups on 6" box, 2 x 10     Precor hamstring curls 55#  3x10  Standing gastroc stretch 3x30 secs B    Shawn participated in dynamic functional therapeutic activities to improve functional performance for 8  " minutes, including:    Precor hip abduction 50#  3x10  Precor leg press  40#  3x10    Home Exercises Provided and Patient Education Provided     Education provided:   - low impact cardio options.    Written Home Exercises Provided: yes.  Exercises were reviewed and Shawn was able to demonstrate them prior to the end of the session.  Shawn demonstrated good  understanding of the education provided.     See EMR under Patient Instructions for exercises provided prior visit.    Assessment     Shawn displays decreased strength and lower extremity mobilty.  Able to tolerate all therex without pain.   Will continue to progress with therapy.    Shawn is progressing well towards her goals.   Pt prognosis is Good.     Pt will continue to benefit from skilled outpatient physical therapy to address the deficits listed in the problem list box on initial evaluation, provide pt/family education and to maximize pt's level of independence in the home and community environment.     Pt's spiritual, cultural and educational needs considered and pt agreeable to plan of care and goals.    Anticipated barriers to physical therapy: None    Goals:     GOALS:  SHORT TERM GOALS: 4 weeks 12/2/2024     Recent signs and systems trend is improving in order to progress towards LTG's. ongoing   Patient will be independent with HEP in order to further progress and return to maximal function. ongoing   Pain rating at Worst: 5/10 in order to progress towards increased independence with activity. ongoing   Patient will be able to correct postural deviations in sitting and standing, to decrease pain and promote postural awareness for injury prevention.  ongoing      LONG TERM GOALS: 8 weeks 12/2/2024     Patient will return to normal ADL, recreational, and work related activities with less pain and limitation.  ongoing   Patient will improve AROM to stated goals in order to return to maximal functional potential.  ongoing   Patient will  improve Strength to stated goals of appropriate musculature in order to improve functional independence.  ongoing   Pain Rating at Best: 1/10 to improve Quality of Life.  ongoing   Patient will meet predicted functional outcome (FOTO) score: 80% to increase self-worth & perceived functional ability. ongoing   Patient will have met/partially met personal goal of being able to walk without pain. ongoing       Plan     Continue POC as previously stated.    Titus Edwards, PT

## 2024-12-04 ENCOUNTER — CLINICAL SUPPORT (OUTPATIENT)
Dept: REHABILITATION | Facility: HOSPITAL | Age: 59
End: 2024-12-04
Payer: COMMERCIAL

## 2024-12-04 DIAGNOSIS — R53.1 DECREASED STRENGTH: Primary | ICD-10-CM

## 2024-12-04 DIAGNOSIS — M62.89 MUSCLE TIGHTNESS: ICD-10-CM

## 2024-12-04 PROCEDURE — 97112 NEUROMUSCULAR REEDUCATION: CPT | Mod: PN

## 2024-12-04 PROCEDURE — 97530 THERAPEUTIC ACTIVITIES: CPT | Mod: PN

## 2024-12-04 PROCEDURE — 97110 THERAPEUTIC EXERCISES: CPT | Mod: PN

## 2024-12-04 NOTE — PROGRESS NOTES
"[facility] / [facility]  Physical Therapy Daily Treatment Note     Name: Shawn Wagner  Clinic Number: [id number]    Therapy Diagnosis:   Encounter Diagnoses   Name Primary?    Decreased strength Yes    Muscle tightness      Physician: Maddie Amezcua MD    Visit Date: 12/4/2024    Physician: Maddie Amezcua MD      Physician Orders: PT Eval and Treat  Medical Diagnosis from Referral: M25.561,M25.562,G89.29 (ICD-10-CM) - Chronic pain of both knees  Evaluation Date: 11/25/2024  Authorization Period Expiration: 11/08/2025  Plan of Care Expiration:  1/30/2025               Progress Update: 12/26/2024                      FOTO: 1 / 3   Visit # / Visits authorized: 2 / 12         Time In: 1715  (Pnt arrived late)  Time Out: 1800  Total Billable Time: 45 minutes    Precautions: Standard  Insurance: Payor: [employer] / Plan: [facility] CHOICE PLUS / Product Type: Commercial /     Subjective     Pt reports: that she felt ok after last session.     She was compliant with home exercise program.  Response to previous treatment: n/a  Functional change: n/a    Pain: 3/10  Location: left knee      Objective     Shawn received therapeutic exercises to develop strength, endurance, ROM, flexibility, posture, and core stabilization for 10 minutes including:    Recumbent bike x 10 mins     Shawn participated in neuromuscular re-education activities to improve: Balance, Coordination, Kinesthetic, Sense, Proprioception, and Posture for 27 minutes. The following activities were included:     SLRs  3x10  SAQs 4# 3x10 - np  LAQs  2# 3x10  Bridges 3x10  Hip adduction with ball 3x10   DKTC with PB  3x10   Clams with green tb 3x10 B     Standing heel/toe raises  3x10  TKE with ball  3x10  Seated hamstring stretch 3x30 secs B   Step ups on 6" box, 2 x 10     Precor hamstring curls 55#  3x10  Standing gastroc stretch 3x30 secs B    Shawn participated in dynamic functional therapeutic activities to improve functional " performance for 8  minutes, including:    Precor hip abduction 65#  3x10  Precor leg press  40#  3x10    Home Exercises Provided and Patient Education Provided     Education provided:   - low impact cardio options.    Written Home Exercises Provided: yes.  Exercises were reviewed and Shawn was able to demonstrate them prior to the end of the session.  Shawn demonstrated good  understanding of the education provided.     See EMR under Patient Instructions for exercises provided prior visit.    Assessment     Shawn displays decreased strength and lower extremity mobilty.  Able to tolerate all therex without pain.   Will continue to progress with therapy.    Shawn is progressing well towards her goals.   Pt prognosis is Good.     Pt will continue to benefit from skilled outpatient physical therapy to address the deficits listed in the problem list box on initial evaluation, provide pt/family education and to maximize pt's level of independence in the home and community environment.     Pt's spiritual, cultural and educational needs considered and pt agreeable to plan of care and goals.    Anticipated barriers to physical therapy: None    Goals:     GOALS:  SHORT TERM GOALS: 4 weeks 12/4/2024     Recent signs and systems trend is improving in order to progress towards LTG's. ongoing   Patient will be independent with HEP in order to further progress and return to maximal function. ongoing   Pain rating at Worst: 5/10 in order to progress towards increased independence with activity. ongoing   Patient will be able to correct postural deviations in sitting and standing, to decrease pain and promote postural awareness for injury prevention.  ongoing      LONG TERM GOALS: 8 weeks 12/4/2024     Patient will return to normal ADL, recreational, and work related activities with less pain and limitation.  ongoing   Patient will improve AROM to stated goals in order to return to maximal functional potential.   ongoing   Patient will improve Strength to stated goals of appropriate musculature in order to improve functional independence.  ongoing   Pain Rating at Best: 1/10 to improve Quality of Life.  ongoing   Patient will meet predicted functional outcome (FOTO) score: 80% to increase self-worth & perceived functional ability. ongoing   Patient will have met/partially met personal goal of being able to walk without pain. ongoing       Plan     Continue POC as previously stated.    Titus Edwards, PT

## 2024-12-06 ENCOUNTER — OFFICE VISIT (OUTPATIENT)
Dept: PAIN MEDICINE | Facility: CLINIC | Age: 59
End: 2024-12-06
Payer: COMMERCIAL

## 2024-12-06 VITALS — WEIGHT: 175 LBS | BODY MASS INDEX: 28.12 KG/M2 | HEIGHT: 66 IN

## 2024-12-06 DIAGNOSIS — M25.562 CHRONIC PAIN OF BOTH KNEES: Primary | ICD-10-CM

## 2024-12-06 DIAGNOSIS — G89.29 CHRONIC PAIN OF BOTH KNEES: Primary | ICD-10-CM

## 2024-12-06 DIAGNOSIS — M25.561 CHRONIC PAIN OF BOTH KNEES: Primary | ICD-10-CM

## 2024-12-06 PROCEDURE — 99999 PR PBB SHADOW E&M-EST. PATIENT-LVL III: CPT | Mod: PBBFAC,,, | Performed by: STUDENT IN AN ORGANIZED HEALTH CARE EDUCATION/TRAINING PROGRAM

## 2024-12-06 RX ORDER — AMLODIPINE BESYLATE 5 MG/1
5 TABLET ORAL DAILY
COMMUNITY

## 2024-12-06 NOTE — PROGRESS NOTES
[facility] - Department    Sofi Edgar MD      First Office Visit: 7/2/24  Today' Date: 12/6/2024  Last Office Visit: 11/8/24    Chief complaint: bilateral knee pain     HPI: Pt is a pleasant 59 y.o., who presents for evaluation. Referred by Lorne Ruiz PA-C. Pt seen previously for neck tingling and bilateral arm numbness/tingling.  Patient is seen today for f/u after XR B knees. Pt continues to endorse having pain in the anterior portion of bilateral knees. XR knees showed mild arthritis. Pt has started doing PT for knees and doing well with it. States R sided neck and L arm pain is still minimal s/p STEFFANY.           Pain disability index score: 63  Pain score: 5    Relevant Imaging/ Testing:   XR B knees 11/24  X-ray C-spine 7/24    Procedures:   ILESI C7-T1 10/28/24    Date of board of pharmacy review:12/6/2024  Date of opioid risk screening/ pain psych: None  Date of opioid agreement and consent: None  Date of urine drug screen: None  Date of random pill count: None     was reviewed today: reviewed, no concerns     Prescribed medications: None    See EHR for  PMH, PSH, FH, SH, Medications and Allergy    ROS:  Positive for pain  ROS     PE:  There were no vitals filed for this visit.  General: Pleasant, no distress  HEENT: NC/ AT. PERRLA  CV: Radial pulses intact  Pulm: No distress  Ext: No edema    Physical Exam     Neuromusculoskeletal:  Head: NC, AT. PERRLA. No occipital tenderness  Neck: Intact range of motions, extension, flexion, rotation. Neg Facet loading. Neg Spurling. Min Tenderness.  5/5 Strength, normal tone. Neg Nieves's  Shoulder: Intact range of motion  Lumbar: Intact range of motion  Hip: Intact range of motion  SI: Level  Knee: Intact range of motion, nontender to palpation   Reflexes: normal Bicep, knee   Strength: 5/5 globally   Sensory: Grossly intact   Skin: No bruising, erythema  Gait: Normal      Impression:  Knee pain  Neck pain (L>R)   Bilat arm numbness/tingling    Relevant History  BMI 28.04    Assessment:  Bilateral knee mild osteoarthritis  Cervical radiculopathy - sxs resolving with last ALINA       Plan:  Discussed options  Imaging/ relevant records viewed/ reviewed/ discussed  Imaging results viewed and reviewed (noted above)/ reviewed with patient   reviewed  Cont HEP  Cont PT  Discussed continuing PT for bilat knees. Discussed with pt XR of knees only show mild degenerative changes. Pt would like to continue PT for now. Can consider B knee steroid injections if pain significantly worsens.   ILESI C7-T1 prn neck and arm pain   Orthopedic surgery for knees if indicated        Prescribed medications:  1. None    The impression and plan were discussed and explained in detail. All the questions were answered. Education was provided accordingly.           Follow-up:  As needed    Maddie Amezcua MD

## 2024-12-10 ENCOUNTER — OFFICE VISIT (OUTPATIENT)
Dept: ORTHOPEDICS | Facility: CLINIC | Age: 59
End: 2024-12-10
Payer: COMMERCIAL

## 2024-12-10 VITALS — BODY MASS INDEX: 28.14 KG/M2 | HEIGHT: 66 IN | WEIGHT: 175.06 LBS

## 2024-12-10 DIAGNOSIS — M17.0 PRIMARY OSTEOARTHRITIS OF BOTH KNEES: Primary | ICD-10-CM

## 2024-12-10 PROCEDURE — 3008F BODY MASS INDEX DOCD: CPT | Mod: CPTII,S$GLB,, | Performed by: ORTHOPAEDIC SURGERY

## 2024-12-10 PROCEDURE — 4010F ACE/ARB THERAPY RXD/TAKEN: CPT | Mod: CPTII,S$GLB,, | Performed by: ORTHOPAEDIC SURGERY

## 2024-12-10 PROCEDURE — 99214 OFFICE O/P EST MOD 30 MIN: CPT | Mod: S$GLB,,, | Performed by: ORTHOPAEDIC SURGERY

## 2024-12-10 PROCEDURE — 3044F HG A1C LEVEL LT 7.0%: CPT | Mod: CPTII,S$GLB,, | Performed by: ORTHOPAEDIC SURGERY

## 2024-12-10 PROCEDURE — 99999 PR PBB SHADOW E&M-EST. PATIENT-LVL III: CPT | Mod: PBBFAC,,, | Performed by: ORTHOPAEDIC SURGERY

## 2024-12-10 PROCEDURE — 1159F MED LIST DOCD IN RCRD: CPT | Mod: CPTII,S$GLB,, | Performed by: ORTHOPAEDIC SURGERY

## 2024-12-10 RX ORDER — MELOXICAM 15 MG/1
15 TABLET ORAL DAILY
Qty: 30 TABLET | Refills: 3 | Status: SHIPPED | OUTPATIENT
Start: 2024-12-10

## 2024-12-10 NOTE — PROGRESS NOTES
Patient ID: Shawn Wagner is a 59 y.o. female    Chief Complaint:   Chief Complaint   Patient presents with    Knee Pain     Pt c/o bilat knee pain. Pt stated the pain started approx 2 mo ago. The pain is described as a nagging, lingering sharp pain.       History of Present Illness:    Pleasant 59-year-old female here for evaluation of bilateral knee pain.  She reports about 2 month history of bilateral knee pain.  Mostly pain is on the anterior knee and medial aspect of the knee.  Denies any mechanical symptoms or locking sensation.  No recent injections or surgery.  Has tried Voltaren gel.  Family history of DJD, specifically her mother.  Diabetic    PAST MEDICAL HISTORY:   Past Medical History:   Diagnosis Date    Diabetes mellitus     Hypertension      PAST SURGICAL HISTORY:   Past Surgical History:   Procedure Laterality Date     SECTION      EPIDURAL STEROID INJECTION INTO CERVICAL SPINE N/A 10/28/2024    Procedure: Injection-steroid-epidural-cervical;  Surgeon: Maddie Amezcua MD;  Location: [facility];  Service: Pain Management;  Laterality: N/A;     FAMILY HISTORY:   Family History   Problem Relation Name Age of Onset    Diabetes Mother      Breast cancer Maternal Aunt      Cancer Neg Hx      Colon cancer Neg Hx      Ovarian cancer Neg Hx       SOCIAL HISTORY:   Social History     Occupational History    Not on file   Tobacco Use    Smoking status: Never    Smokeless tobacco: Never   Substance and Sexual Activity    Alcohol use: Not Currently    Drug use: Never    Sexual activity: Not on file        MEDICATIONS:   Current Outpatient Medications:     amLODIPine (NORVASC) 5 MG tablet, Take 5 mg by mouth once daily., Disp: , Rfl:     gabapentin (NEURONTIN) 300 MG capsule, Take 1 capsule (300 mg total) by mouth 3 (three) times daily., Disp: 90 capsule, Rfl: 11    hydroCHLOROthiazide (MICROZIDE) 12.5 mg capsule, 1 cap(s), Disp: , Rfl:     lisinopriL-hydrochlorothiazide (PRINZIDE,ZESTORETIC) 20-25 mg Tab,  Take 1 tablet by mouth., Disp: , Rfl:     metFORMIN (GLUCOPHAGE-XR) 750 MG ER 24hr tablet, Take 750 mg by mouth once daily., Disp: , Rfl:   ALLERGIES: Review of patient's allergies indicates:  No Known Allergies      Physical Exam     There were no vitals filed for this visit.  Alert and oriented to person, place and time. No acute distress. Well-groomed, not ill appearing. Pupils round and reactive, normal respiratory effort, no audible wheezing.     GENERAL:  A well-developed, well-nourished 59 y.o. female who is alert and       oriented in no acute distress.      Gait: She  walks with a non gait.                   EXTREMITIES:  Examination of lower extremities reveals there is no visible mass or deformity.    Left knee:  ROM 0-130    Ligamentously stable to varus/valgus stress.    Anterior and posterior drawers negative.    No pain over pes bursa.    No warmth    No erythema     Effusion Yes    medial joint line tenderness    Negative Patellofemoral grind/crepitus     Right knee:  ROM 0-130    Ligamentously stable to varus/valgus stress.    Anterior and posterior drawers negative.    No pain over pes bursa.    No warmth    No erythema    Effusion No    medial joint line tenderness    Negative Patellofemoral grind/crepitus     The skin over both lower extremities is normal and unremarkable.  She has a  painless range of motion of the hips and ankles bilaterally.   Sensation is intact in both lower extremities.    There are no motor deficits in the lower extremities bilaterally.   Pedal pulses are palpable distally bilaterally.    She has no calf tenderness to palpation nor edema.       Imaging:       X-Ray: I have reviewed all pertinent results/findings and my personal findings are:  Non standing bilateral knee x-rays show moderate DJD with joint space narrowing and osteophyte lipping on the medial side, patellofemoral arthrosis      Assessment & Plan    Primary osteoarthritis of both knees         I made the  decision to obtain old records of the patient including previous notes and imaging. New imaging, if ordered today of the extremity or extremities, were evaluated. I independently reviewed and interpreted the radiographs and/or MRIs/CT scan today as well as prior imaging. I also reviewed the referring provider's documentation as well as any pertinent labs, tests and imaging.     Shawn Wagner is a 59 y.o. female with bilateral knee DJD    Treatment options were discussed in detail with the patient. We discussed multiple options including non-operative and operative treatment options.   Non-operatively we discussed bracing, physical therapy and injections. Operatively we discussed knee arthroscopy as well as indications for knee replacement.    After shared medical decision-making with the patient, patient would like to proceed with a trial of:     Continue to monitor and see if this improves  Trial of Anti-Inflammatories    We can consider knee injections if pain is refractory to conservative treatment    All questions were answered and patient is agreeable to the above plan.

## 2024-12-11 ENCOUNTER — CLINICAL SUPPORT (OUTPATIENT)
Dept: REHABILITATION | Facility: HOSPITAL | Age: 59
End: 2024-12-11
Payer: COMMERCIAL

## 2024-12-11 DIAGNOSIS — R53.1 DECREASED STRENGTH: Primary | ICD-10-CM

## 2024-12-11 DIAGNOSIS — M62.89 MUSCLE TIGHTNESS: ICD-10-CM

## 2024-12-11 PROCEDURE — 97110 THERAPEUTIC EXERCISES: CPT | Mod: PN

## 2024-12-11 PROCEDURE — 97112 NEUROMUSCULAR REEDUCATION: CPT | Mod: PN

## 2024-12-11 PROCEDURE — 97530 THERAPEUTIC ACTIVITIES: CPT | Mod: PN

## 2024-12-11 NOTE — PROGRESS NOTES
"[facility] / [facility]  Physical Therapy Daily Treatment Note     Name: Shawn Wagner  Clinic Number: [id number]    Therapy Diagnosis:   Encounter Diagnoses   Name Primary?    Decreased strength Yes    Muscle tightness      Physician: Maddie Amezcua MD    Visit Date: 12/11/2024    Physician: Maddie Amezcua MD      Physician Orders: PT Eval and Treat  Medical Diagnosis from Referral: M25.561,M25.562,G89.29 (ICD-10-CM) - Chronic pain of both knees  Evaluation Date: 11/25/2024  Authorization Period Expiration: 11/08/2025  Plan of Care Expiration:  1/30/2025               Progress Update: 12/26/2024                      FOTO: 1 / 3   Visit # / Visits authorized: 2 / 12         Time In: 1710  (Pnt arrived late)  Time Out: 1800  Total Billable Time: 45 minutes    Precautions: Standard  Insurance: Payor: [employer] / Plan: [facility] CHOICE PLUS / Product Type: Commercial /     Subjective     Pt reports: that she felt ok after last session.     She was compliant with home exercise program.  Response to previous treatment: n/a  Functional change: n/a    Pain: 3/10  Location: left knee      Objective     Shawn received therapeutic exercises to develop strength, endurance, ROM, flexibility, posture, and core stabilization for 10 minutes including:    Recumbent bike x 10 mins     Shawn participated in neuromuscular re-education activities to improve: Balance, Coordination, Kinesthetic, Sense, Proprioception, and Posture for 27 minutes. The following activities were included:     SLRs  3x10  SAQs 4# 3x10   LAQs  2# 3x10  Bridges 3x10  Hip adduction with ball 3x10   DKTC with PB  3x10   Clams with green tb 3x10 B     Standing heel/toe raises  3x10  TKE with ball  3x10  Seated hamstring stretch 3x30 secs B   Step ups on 6" box, 2 x 10     Precor hamstring curls 55#  3x10  Standing gastroc stretch 3x30 secs B    Shawn participated in dynamic functional therapeutic activities to improve functional " performance for 8  minutes, including:    Precor hip abduction 65#  3x10  Precor leg press  40#  3x10    Home Exercises Provided and Patient Education Provided     Education provided:   - low impact cardio options.    Written Home Exercises Provided: yes.  Exercises were reviewed and Shawn was able to demonstrate them prior to the end of the session.  Shawn demonstrated good  understanding of the education provided.     See EMR under Patient Instructions for exercises provided prior visit.    Assessment     Shawn displays decreased strength and lower extremity mobilty.  Able to tolerate all therex without pain.   Will continue to progress with therapy.    Shawn is progressing well towards her goals.   Pt prognosis is Good.     Pt will continue to benefit from skilled outpatient physical therapy to address the deficits listed in the problem list box on initial evaluation, provide pt/family education and to maximize pt's level of independence in the home and community environment.     Pt's spiritual, cultural and educational needs considered and pt agreeable to plan of care and goals.    Anticipated barriers to physical therapy: None    Goals:     GOALS:  SHORT TERM GOALS: 4 weeks 12/11/2024     Recent signs and systems trend is improving in order to progress towards LTG's. ongoing   Patient will be independent with HEP in order to further progress and return to maximal function. ongoing   Pain rating at Worst: 5/10 in order to progress towards increased independence with activity. ongoing   Patient will be able to correct postural deviations in sitting and standing, to decrease pain and promote postural awareness for injury prevention.  ongoing      LONG TERM GOALS: 8 weeks 12/11/2024     Patient will return to normal ADL, recreational, and work related activities with less pain and limitation.  ongoing   Patient will improve AROM to stated goals in order to return to maximal functional potential.   ongoing   Patient will improve Strength to stated goals of appropriate musculature in order to improve functional independence.  ongoing   Pain Rating at Best: 1/10 to improve Quality of Life.  ongoing   Patient will meet predicted functional outcome (FOTO) score: 80% to increase self-worth & perceived functional ability. ongoing   Patient will have met/partially met personal goal of being able to walk without pain. ongoing       Plan     Continue POC as previously stated.    Titus Edwards, PT

## 2024-12-18 ENCOUNTER — CLINICAL SUPPORT (OUTPATIENT)
Dept: REHABILITATION | Facility: HOSPITAL | Age: 59
End: 2024-12-18
Payer: COMMERCIAL

## 2024-12-18 DIAGNOSIS — M62.89 MUSCLE TIGHTNESS: ICD-10-CM

## 2024-12-18 DIAGNOSIS — R53.1 DECREASED STRENGTH: Primary | ICD-10-CM

## 2024-12-18 PROCEDURE — 97112 NEUROMUSCULAR REEDUCATION: CPT | Mod: PN

## 2024-12-18 PROCEDURE — 97530 THERAPEUTIC ACTIVITIES: CPT | Mod: PN

## 2024-12-18 PROCEDURE — 97110 THERAPEUTIC EXERCISES: CPT | Mod: PN

## 2024-12-18 NOTE — PROGRESS NOTES
"[facility] / [facility]  Physical Therapy Daily Treatment Note     Name: Shawn Wagner  Clinic Number: [id number]    Therapy Diagnosis:   Encounter Diagnoses   Name Primary?    Decreased strength Yes    Muscle tightness      Physician: Maddie Amezcua MD    Visit Date: 12/18/2024    Physician: Maddie Amezcua MD      Physician Orders: PT Eval and Treat  Medical Diagnosis from Referral: M25.561,M25.562,G89.29 (ICD-10-CM) - Chronic pain of both knees  Evaluation Date: 11/25/2024  Authorization Period Expiration: 11/08/2025  Plan of Care Expiration:  1/30/2025               Progress Update: 12/26/2024                      FOTO: 1 / 3   Visit # / Visits authorized: 4 / 12         Time In: 1713  (Pnt arrived late)  Time Out: 1754  Total Billable Time: 41 minutes    Precautions: Standard  Insurance: Payor: [employer] / Plan: [facility] CHOICE PLUS / Product Type: Commercial /     Subjective     Pt reports: that she is about the same as last visit.  States that she was able to get her bike.   She was compliant with home exercise program.  Response to previous treatment: n/a  Functional change: n/a    Pain: 4/10  Location: left knee      Objective     Shawn received therapeutic exercises to develop strength, endurance, ROM, flexibility, posture, and core stabilization for 10 minutes including:    Recumbent bike x 10 mins     Shawn participated in neuromuscular re-education activities to improve: Balance, Coordination, Kinesthetic, Sense, Proprioception, and Posture for 23 minutes. The following activities were included:     SLRs  3x10  SAQs 4# 3x10   LAQs  2# 3x10  Bridges 3x10  Hip adduction with ball 3x10   DKTC with PB  3x10   Clams with green tb 3x10 B     Standing heel/toe raises  3x10  TKE with ball  3x10  Seated hamstring stretch 3x30 secs B   Step ups on 6" box, 2 x 10     Precor hamstring curls 55#  3x10 - np  Standing gastroc stretch 3x30 secs B     Shawn participated in dynamic " functional therapeutic activities to improve functional performance for 8  minutes, including:    Precor hip abduction 65#  3x10  Precor leg press  40#  3x10    Home Exercises Provided and Patient Education Provided     Education provided:   - low impact cardio options.    Written Home Exercises Provided: yes.  Exercises were reviewed and Shawn was able to demonstrate them prior to the end of the session.  Shawn demonstrated good  understanding of the education provided.     See EMR under Patient Instructions for exercises provided prior visit.    Assessment     Shawn displays decreased strength and lower extremity mobilty.  Able to tolerate all therex without pain.   Will continue to progress with therapy.    Shawn is progressing well towards her goals.   Pt prognosis is Good.     Pt will continue to benefit from skilled outpatient physical therapy to address the deficits listed in the problem list box on initial evaluation, provide pt/family education and to maximize pt's level of independence in the home and community environment.     Pt's spiritual, cultural and educational needs considered and pt agreeable to plan of care and goals.    Anticipated barriers to physical therapy: None    Goals:     GOALS:  SHORT TERM GOALS: 4 weeks 12/18/2024     Recent signs and systems trend is improving in order to progress towards LTG's. ongoing   Patient will be independent with HEP in order to further progress and return to maximal function. ongoing   Pain rating at Worst: 5/10 in order to progress towards increased independence with activity. ongoing   Patient will be able to correct postural deviations in sitting and standing, to decrease pain and promote postural awareness for injury prevention.  ongoing      LONG TERM GOALS: 8 weeks 12/18/2024     Patient will return to normal ADL, recreational, and work related activities with less pain and limitation.  ongoing   Patient will improve AROM to stated goals  in order to return to maximal functional potential.  ongoing   Patient will improve Strength to stated goals of appropriate musculature in order to improve functional independence.  ongoing   Pain Rating at Best: 1/10 to improve Quality of Life.  ongoing   Patient will meet predicted functional outcome (FOTO) score: 80% to increase self-worth & perceived functional ability. ongoing   Patient will have met/partially met personal goal of being able to walk without pain. ongoing       Plan     Continue POC as previously stated.    Titus Edwards, PT

## (undated) DEVICE — SYS LABEL CORRECT MED

## (undated) DEVICE — NDL TUOHY EPIDURAL 20G X 3.5

## (undated) DEVICE — NDL ECLIPSE SAF REG 25GX1.5IN

## (undated) DEVICE — SYR PLASTIC 8ML PERIFIX LL

## (undated) DEVICE — SYR DISP LL 5CC

## (undated) DEVICE — APPLICATOR CHLORAPREP ORN 26ML

## (undated) DEVICE — STRAP OR TABLE 5IN X 72IN

## (undated) DEVICE — TOWEL OR DISP STRL BLUE 4/PK

## (undated) DEVICE — NDL BLUNT W/O FILTER 18GX1.5IN

## (undated) DEVICE — GLOVE SENSICARE PI ALOE 6